# Patient Record
Sex: FEMALE | Race: WHITE | Employment: FULL TIME | URBAN - NONMETROPOLITAN AREA
[De-identification: names, ages, dates, MRNs, and addresses within clinical notes are randomized per-mention and may not be internally consistent; named-entity substitution may affect disease eponyms.]

---

## 2015-02-19 LAB — PAP SMEAR: NORMAL

## 2019-12-09 RX ORDER — ROSUVASTATIN CALCIUM 10 MG/1
TABLET, COATED ORAL
Qty: 90 TABLET | Refills: 0 | Status: SHIPPED | OUTPATIENT
Start: 2019-12-09 | End: 2020-01-13 | Stop reason: SDUPTHER

## 2019-12-17 RX ORDER — FLUOXETINE HYDROCHLORIDE 20 MG/1
CAPSULE ORAL
Qty: 90 CAPSULE | OUTPATIENT
Start: 2019-12-17

## 2019-12-18 RX ORDER — FLUOXETINE HYDROCHLORIDE 20 MG/1
20 CAPSULE ORAL DAILY
Qty: 30 CAPSULE | Refills: 0 | Status: SHIPPED | OUTPATIENT
Start: 2019-12-18 | End: 2020-01-13 | Stop reason: SDUPTHER

## 2020-01-13 ENCOUNTER — OFFICE VISIT (OUTPATIENT)
Dept: FAMILY MEDICINE CLINIC | Age: 60
End: 2020-01-13
Payer: COMMERCIAL

## 2020-01-13 ENCOUNTER — HOSPITAL ENCOUNTER (OUTPATIENT)
Age: 60
Discharge: HOME | End: 2020-01-15
Payer: COMMERCIAL

## 2020-01-13 VITALS
SYSTOLIC BLOOD PRESSURE: 138 MMHG | HEART RATE: 89 BPM | HEIGHT: 64 IN | DIASTOLIC BLOOD PRESSURE: 88 MMHG | TEMPERATURE: 97.5 F | WEIGHT: 170 LBS | OXYGEN SATURATION: 97 % | BODY MASS INDEX: 29.02 KG/M2

## 2020-01-13 PROBLEM — F32.9 REACTIVE DEPRESSION: Status: ACTIVE | Noted: 2020-01-13

## 2020-01-13 PROBLEM — E78.49 OTHER HYPERLIPIDEMIA: Status: ACTIVE | Noted: 2020-01-13

## 2020-01-13 LAB
ALBUMIN SERPL-MCNC: 4.7 G/DL (ref 3.5–5.2)
ALP BLD-CCNC: 85 U/L (ref 35–104)
ALT SERPL-CCNC: 17 U/L (ref 0–32)
AMYLASE: 21 U/L (ref 20–100)
ANION GAP SERPL CALCULATED.3IONS-SCNC: 15 MMOL/L (ref 7–16)
AST SERPL-CCNC: 19 U/L (ref 0–31)
BASOPHILS ABSOLUTE: 0.05 E9/L (ref 0–0.2)
BASOPHILS RELATIVE PERCENT: 0.6 % (ref 0–2)
BILIRUB SERPL-MCNC: 0.3 MG/DL (ref 0–1.2)
BILIRUBIN, POC: NEGATIVE
BLOOD URINE, POC: NEGATIVE
BUN BLDV-MCNC: 8 MG/DL (ref 6–20)
CALCIUM SERPL-MCNC: 10 MG/DL (ref 8.6–10.2)
CHLORIDE BLD-SCNC: 101 MMOL/L (ref 98–107)
CHOLESTEROL, TOTAL: 200 MG/DL (ref 0–199)
CLARITY, POC: CLEAR
CO2: 26 MMOL/L (ref 22–29)
COLOR, POC: NORMAL
CREAT SERPL-MCNC: 0.5 MG/DL (ref 0.5–1)
EOSINOPHILS ABSOLUTE: 0.06 E9/L (ref 0.05–0.5)
EOSINOPHILS RELATIVE PERCENT: 0.8 % (ref 0–6)
GAMMA GLUTAMYL TRANSFERASE: 23 U/L (ref 6–42)
GFR AFRICAN AMERICAN: >60
GFR NON-AFRICAN AMERICAN: >60 ML/MIN/1.73
GLUCOSE BLD-MCNC: 93 MG/DL (ref 74–99)
GLUCOSE URINE, POC: NEGATIVE
HCT VFR BLD CALC: 42.1 % (ref 34–48)
HDLC SERPL-MCNC: 52 MG/DL
HEMOGLOBIN: 12.8 G/DL (ref 11.5–15.5)
IMMATURE GRANULOCYTES #: 0.03 E9/L
IMMATURE GRANULOCYTES %: 0.4 % (ref 0–5)
KETONES, POC: NEGATIVE
LDL CHOLESTEROL CALCULATED: 116 MG/DL (ref 0–99)
LEUKOCYTE EST, POC: NEGATIVE
LYMPHOCYTES ABSOLUTE: 1.86 E9/L (ref 1.5–4)
LYMPHOCYTES RELATIVE PERCENT: 24 % (ref 20–42)
MCH RBC QN AUTO: 29.6 PG (ref 26–35)
MCHC RBC AUTO-ENTMCNC: 30.4 % (ref 32–34.5)
MCV RBC AUTO: 97.2 FL (ref 80–99.9)
MONOCYTES ABSOLUTE: 0.54 E9/L (ref 0.1–0.95)
MONOCYTES RELATIVE PERCENT: 7 % (ref 2–12)
NEUTROPHILS ABSOLUTE: 5.2 E9/L (ref 1.8–7.3)
NEUTROPHILS RELATIVE PERCENT: 67.2 % (ref 43–80)
NITRITE, POC: NEGATIVE
PDW BLD-RTO: 12.9 FL (ref 11.5–15)
PH, POC: 7.5
PLATELET # BLD: 332 E9/L (ref 130–450)
PMV BLD AUTO: 9.7 FL (ref 7–12)
POTASSIUM SERPL-SCNC: 4 MMOL/L (ref 3.5–5)
PROTEIN, POC: NEGATIVE
RBC # BLD: 4.33 E12/L (ref 3.5–5.5)
SODIUM BLD-SCNC: 142 MMOL/L (ref 132–146)
SPECIFIC GRAVITY, POC: 1.02
TOTAL PROTEIN: 7.5 G/DL (ref 6.4–8.3)
TRIGL SERPL-MCNC: 161 MG/DL (ref 0–149)
TSH SERPL DL<=0.05 MIU/L-ACNC: 1.32 UIU/ML (ref 0.27–4.2)
UROBILINOGEN, POC: NORMAL
VLDLC SERPL CALC-MCNC: 32 MG/DL
WBC # BLD: 7.7 E9/L (ref 4.5–11.5)

## 2020-01-13 PROCEDURE — 80053 COMPREHEN METABOLIC PANEL: CPT

## 2020-01-13 PROCEDURE — 85025 COMPLETE CBC W/AUTO DIFF WBC: CPT

## 2020-01-13 PROCEDURE — 82150 ASSAY OF AMYLASE: CPT

## 2020-01-13 PROCEDURE — 80061 LIPID PANEL: CPT

## 2020-01-13 PROCEDURE — 99396 PREV VISIT EST AGE 40-64: CPT | Performed by: FAMILY MEDICINE

## 2020-01-13 PROCEDURE — 84443 ASSAY THYROID STIM HORMONE: CPT

## 2020-01-13 PROCEDURE — 82977 ASSAY OF GGT: CPT

## 2020-01-13 PROCEDURE — 90471 IMMUNIZATION ADMIN: CPT | Performed by: FAMILY MEDICINE

## 2020-01-13 PROCEDURE — 36415 COLL VENOUS BLD VENIPUNCTURE: CPT

## 2020-01-13 PROCEDURE — 90715 TDAP VACCINE 7 YRS/> IM: CPT | Performed by: FAMILY MEDICINE

## 2020-01-13 PROCEDURE — 81002 URINALYSIS NONAUTO W/O SCOPE: CPT | Performed by: FAMILY MEDICINE

## 2020-01-13 RX ORDER — FLUOXETINE HYDROCHLORIDE 20 MG/1
20 CAPSULE ORAL DAILY
Qty: 90 CAPSULE | Refills: 1 | Status: SHIPPED
Start: 2020-01-13 | End: 2020-02-07 | Stop reason: SDUPTHER

## 2020-01-13 RX ORDER — ROSUVASTATIN CALCIUM 10 MG/1
TABLET, COATED ORAL
Qty: 90 TABLET | Refills: 1 | Status: SHIPPED
Start: 2020-01-13 | End: 2020-07-15 | Stop reason: SDUPTHER

## 2020-01-13 SDOH — HEALTH STABILITY: MENTAL HEALTH: HOW OFTEN DO YOU HAVE A DRINK CONTAINING ALCOHOL?: MONTHLY OR LESS

## 2020-01-13 SDOH — HEALTH STABILITY: MENTAL HEALTH: HOW MANY STANDARD DRINKS CONTAINING ALCOHOL DO YOU HAVE ON A TYPICAL DAY?: 1 OR 2

## 2020-01-13 ASSESSMENT — ENCOUNTER SYMPTOMS
DIARRHEA: 0
CONSTIPATION: 0
VOMITING: 0
BLOOD IN STOOL: 0
ABDOMINAL PAIN: 0
EYES NEGATIVE: 1
COUGH: 0
CHEST TIGHTNESS: 0
WHEEZING: 0

## 2020-01-13 ASSESSMENT — PATIENT HEALTH QUESTIONNAIRE - PHQ9
SUM OF ALL RESPONSES TO PHQ9 QUESTIONS 1 & 2: 0
2. FEELING DOWN, DEPRESSED OR HOPELESS: 0
SUM OF ALL RESPONSES TO PHQ QUESTIONS 1-9: 0
SUM OF ALL RESPONSES TO PHQ QUESTIONS 1-9: 0
1. LITTLE INTEREST OR PLEASURE IN DOING THINGS: 0

## 2020-01-13 NOTE — PROGRESS NOTES
OFFICE NOTE    1/13/20  Name: Tre Ca  U:[id number]   Sex:female   Age:59 y.o. SUBJECTIVE  Chief Complaint   Patient presents with    Depression    Hyperlipidemia       Pt presents for routine follow up. Requires routine medication refills. Was last seen 11/29/2018. Denies new s/s or complaints. She and  are eating healthier, exercising more and she is down 7 lbs from her weight 18 mos ago           Review of Systems   Constitutional: Negative for appetite change, fever and unexpected weight change. HENT: Negative for congestion, ear pain and postnasal drip. Eyes: Negative. Respiratory: Negative for cough, chest tightness and wheezing. Cardiovascular: Negative for chest pain and palpitations. Gastrointestinal: Negative for abdominal pain, blood in stool, constipation, diarrhea and vomiting. Endocrine: Negative for cold intolerance, polydipsia and polyuria. Genitourinary: Negative for dysuria and hematuria. Musculoskeletal: Negative for arthralgias, gait problem and joint swelling. Skin: Negative for rash and wound. Allergic/Immunologic: Negative for environmental allergies and food allergies. Neurological: Negative for dizziness, tremors, weakness and headaches. Hematological: Negative for adenopathy. Does not bruise/bleed easily. Psychiatric/Behavioral: Negative for behavioral problems, confusion, dysphoric mood and sleep disturbance. Current Outpatient Medications:     rosuvastatin (CRESTOR) 10 MG tablet, TAKE ONE TABLET BY MOUTH DAILY, Disp: 90 tablet, Rfl: 1    FLUoxetine (PROZAC) 20 MG capsule, Take 1 capsule by mouth daily, Disp: 90 capsule, Rfl: 1  Allergies   Allergen Reactions    Fish-Derived Products        No past medical history on file. No past surgical history on file. No family history on file.   Social History     Tobacco History     Smoking Status  Never Smoker    Smokeless Tobacco Use  Never Used          Alcohol History     Alcohol Use Status  Yes          Drug Use     Drug Use Status  Never          Sexual Activity     Sexually Active  Not Asked              OBJECTIVE  Vitals:    01/13/20 1014   BP: 138/88   Site: Right Upper Arm   Position: Sitting   Pulse: 89   Temp: 97.5 °F (36.4 °C)   TempSrc: Temporal   SpO2: 97%   Weight: 170 lb (77.1 kg)   Height: 5' 4\" (1.626 m)       Body mass index is 29.18 kg/m². Orders Placed This Encounter   Procedures    Tdap (age 6y and older) IM ([address])    CBC Auto Differential     Standing Status:   Future     Number of Occurrences:   1     Standing Expiration Date:   1/13/2021    Comprehensive Metabolic Panel     Standing Status:   Future     Number of Occurrences:   1     Standing Expiration Date:   1/13/2021    Lipid Panel     Standing Status:   Future     Number of Occurrences:   1     Standing Expiration Date:   1/13/2021     Order Specific Question:   Is Patient Fasting?/# of Hours     Answer:   fasting    TSH without Reflex     Standing Status:   Future     Number of Occurrences:   1     Standing Expiration Date:   1/13/2021    GAMMA GT     Standing Status:   Future     Number of Occurrences:   1     Standing Expiration Date:   1/13/2021    AMYLASE     Standing Status:   Future     Number of Occurrences:   1     Standing Expiration Date:   1/13/2021    POCT Urinalysis no Micro    POCT Fit Test     Standing Status:   Future     Standing Expiration Date:   1/13/2021        EXAM   Physical Exam  Vitals signs and nursing note reviewed. Constitutional:       Appearance: Normal appearance. She is well-developed. She is not toxic-appearing. Comments: overweight   HENT:      Right Ear: Tympanic membrane, ear canal and external ear normal.      Left Ear: Tympanic membrane, ear canal and external ear normal.      Nose: Nose normal. No congestion. Mouth/Throat:      Pharynx: Oropharynx is clear. Eyes:      General: No scleral icterus.      Conjunctiva/sclera: Conjunctivae normal. Pupils: Pupils are equal, round, and reactive to light. Neck:      Musculoskeletal: Normal range of motion and neck supple. Thyroid: No thyroid mass or thyromegaly. Vascular: No carotid bruit or JVD. Trachea: Trachea normal.   Cardiovascular:      Rate and Rhythm: Normal rate and regular rhythm. Heart sounds: Normal heart sounds. No murmur. No gallop. Pulmonary:      Effort: Pulmonary effort is normal.      Breath sounds: Normal breath sounds. No wheezing or rales. Abdominal:      General: Bowel sounds are normal. There is no distension. Palpations: Abdomen is soft. There is no mass. Tenderness: There is no tenderness. There is no guarding. Musculoskeletal: Normal range of motion. General: No swelling or tenderness. Right lower leg: No edema. Left lower leg: No edema. Lymphadenopathy:      Cervical: No cervical adenopathy. Skin:     General: Skin is warm and dry. Capillary Refill: Capillary refill takes less than 2 seconds. Coloration: Skin is not jaundiced. Findings: No bruising or rash. Neurological:      Mental Status: She is alert and oriented to person, place, and time. Motor: No abnormal muscle tone. Psychiatric:         Mood and Affect: Mood normal.         Behavior: Behavior normal.           ASSESSMENT/PLAN:  Maryam was seen today for depression and hyperlipidemia. Diagnoses and all orders for this visit:    Encounter for wellness examination in adult  -     CBC Auto Differential; Future  -     Comprehensive Metabolic Panel; Future  -     Lipid Panel; Future  -     TSH without Reflex; Future  -     POCT Urinalysis no Micro  -     POCT Fit Test; Future  -     Tdap (age 6y and older) IM ([facility]X)  Following better diet, exercising. Sees Gyn yearly    Reactive depression  -     FLUoxetine (PROZAC) 20 MG capsule;  Take 1 capsule by mouth daily  -     CBC Auto Differential; Future  -     Comprehensive Metabolic Panel; Future  Stable not ready to take her off this yet    Other hyperlipidemia  -     rosuvastatin (CRESTOR) 10 MG tablet; TAKE ONE TABLET BY MOUTH DAILY  -     Lipid Panel; Future  -     TSH without Reflex; Future    SHOEMAKER (nonalcoholic steatohepatitis)  -     GAMMA GT; Future  -     AMYLASE; Future  Family h/o pancreatic cancer in 2 first degree relatives, h/o SHOEMAKER by her description. Diet and weight loss should help    Immunization due  -     Tdap (age 6y and older) IM ([facility] Extension)    Screen for colon cancer  -     POCT Fit Test; Future         Return in about 6 months (around 7/13/2020). Electronically signed by Nate Forrester MD on 1/13/20 at 10:45 AM    I have personally reviewed and updated the chief complaint, HPI, Past Medical, Family and Social History, as well as the above Review of Systems.

## 2020-02-07 RX ORDER — FLUOXETINE HYDROCHLORIDE 20 MG/1
20 CAPSULE ORAL DAILY
Qty: 90 CAPSULE | Refills: 1 | Status: SHIPPED
Start: 2020-02-07 | End: 2020-07-15 | Stop reason: SDUPTHER

## 2020-02-26 ENCOUNTER — TELEPHONE (OUTPATIENT)
Dept: FAMILY MEDICINE CLINIC | Age: 60
End: 2020-02-26

## 2020-07-15 ENCOUNTER — OFFICE VISIT (OUTPATIENT)
Dept: FAMILY MEDICINE CLINIC | Age: 60
End: 2020-07-15
Payer: COMMERCIAL

## 2020-07-15 VITALS
RESPIRATION RATE: 18 BRPM | TEMPERATURE: 97.6 F | DIASTOLIC BLOOD PRESSURE: 78 MMHG | BODY MASS INDEX: 29.37 KG/M2 | WEIGHT: 172 LBS | SYSTOLIC BLOOD PRESSURE: 126 MMHG | HEART RATE: 91 BPM | HEIGHT: 64 IN | OXYGEN SATURATION: 97 %

## 2020-07-15 PROBLEM — Z80.0 FAMILY HISTORY OF MALIGNANT NEOPLASM OF PANCREAS: Status: ACTIVE | Noted: 2020-07-15

## 2020-07-15 PROCEDURE — 93000 ELECTROCARDIOGRAM COMPLETE: CPT | Performed by: FAMILY MEDICINE

## 2020-07-15 PROCEDURE — 99214 OFFICE O/P EST MOD 30 MIN: CPT | Performed by: FAMILY MEDICINE

## 2020-07-15 RX ORDER — FLUOXETINE HYDROCHLORIDE 20 MG/1
20 CAPSULE ORAL DAILY
Qty: 90 CAPSULE | Refills: 1 | Status: SHIPPED | OUTPATIENT
Start: 2020-07-15

## 2020-07-15 RX ORDER — ROSUVASTATIN CALCIUM 10 MG/1
TABLET, COATED ORAL
Qty: 90 TABLET | Refills: 1 | Status: SHIPPED | OUTPATIENT
Start: 2020-07-15

## 2020-07-15 ASSESSMENT — ENCOUNTER SYMPTOMS
EYES NEGATIVE: 1
EYE REDNESS: 0
CHEST TIGHTNESS: 0
CONSTIPATION: 0
COUGH: 0
DIARRHEA: 0
PHOTOPHOBIA: 0
BLOOD IN STOOL: 0
VOMITING: 0
ABDOMINAL PAIN: 0
WHEEZING: 0

## 2020-07-15 NOTE — PROGRESS NOTES
OFFICE NOTE    7/15/20  Name: Lynn Robison  JDS:[id number]   Sex:female   Age:59 y.o. SUBJECTIVE  Chief Complaint   Patient presents with    6 Month Follow-Up    Hyperlipidemia    Medication Refill       HPI Comes in for checkup and refllls. Goes to [facility] for Gyn    Review of Systems   Constitutional: Negative for appetite change, fever and unexpected weight change. HENT: Negative for congestion, ear pain and postnasal drip. Eyes: Negative. Negative for photophobia, redness and visual disturbance. Respiratory: Negative for cough, chest tightness and wheezing. Cardiovascular: Negative for chest pain and palpitations. Gastrointestinal: Negative for abdominal pain, blood in stool, constipation, diarrhea and vomiting. Endocrine: Negative for cold intolerance, polydipsia and polyuria. Genitourinary: Negative for dysuria and hematuria. Musculoskeletal: Negative for arthralgias, gait problem and joint swelling. Skin: Negative for rash and wound. Allergic/Immunologic: Negative for environmental allergies and food allergies. Neurological: Negative for dizziness, tremors, seizures, weakness, numbness and headaches. Hematological: Negative for adenopathy. Does not bruise/bleed easily. Psychiatric/Behavioral: Negative for behavioral problems, confusion, dysphoric mood and sleep disturbance. Current Outpatient Medications:     FLUoxetine (PROZAC) 20 MG capsule, Take 1 capsule by mouth daily, Disp: 90 capsule, Rfl: 1    rosuvastatin (CRESTOR) 10 MG tablet, TAKE ONE TABLET BY MOUTH DAILY, Disp: 90 tablet, Rfl: 1  Allergies   Allergen Reactions    Fish-Derived Products        No past medical history on file. No past surgical history on file. No family history on file.   Social History     Tobacco History     Smoking Status  Never Smoker    Smokeless Tobacco Use  Never Used          Alcohol History     Alcohol Use Status  Yes          Drug Use     Drug Use Status  Never Sexual Activity     Sexually Active  Not Asked                OBJECTIVE  Vitals:    07/15/20 0835   BP: 126/78   Pulse: 91   Resp: 18   Temp: 97.6 °F (36.4 °C)   TempSrc: Temporal   SpO2: 97%   Weight: 172 lb (78 kg)   Height: 5' 4\" (1.626 m)        Body mass index is 29.52 kg/m². Orders Placed This Encounter   Procedures    EKG 12 lead     Order Specific Question:   Reason for Exam?     Answer:   Hypertension        EXAM   Physical Exam  Vitals signs and nursing note reviewed. Constitutional:       Appearance: Normal appearance. She is well-developed. Comments: overweight   HENT:      Right Ear: Tympanic membrane, ear canal and external ear normal.      Left Ear: Tympanic membrane, ear canal and external ear normal.      Nose: Nose normal. No congestion. Mouth/Throat:      Pharynx: Oropharynx is clear. Eyes:      General: No scleral icterus. Conjunctiva/sclera: Conjunctivae normal.      Pupils: Pupils are equal, round, and reactive to light. Neck:      Musculoskeletal: Normal range of motion and neck supple. Thyroid: No thyroid mass or thyromegaly. Vascular: No carotid bruit or JVD. Trachea: Trachea normal.   Cardiovascular:      Rate and Rhythm: Normal rate and regular rhythm. Heart sounds: Normal heart sounds. No murmur. No gallop. Pulmonary:      Effort: Pulmonary effort is normal.      Breath sounds: Normal breath sounds. No wheezing, rhonchi or rales. Abdominal:      General: Bowel sounds are normal. There is no distension. Palpations: Abdomen is soft. There is no mass. Tenderness: There is no abdominal tenderness. There is no guarding. Musculoskeletal: Normal range of motion. General: No swelling or tenderness. Right lower leg: No edema. Left lower leg: No edema. Lymphadenopathy:      Cervical: No cervical adenopathy. Skin:     General: Skin is warm and dry.       Capillary Refill: Capillary refill takes less than 2 seconds. Coloration: Skin is not jaundiced. Findings: No bruising or rash. Neurological:      General: No focal deficit present. Mental Status: She is alert and oriented to person, place, and time. Motor: No abnormal muscle tone. Psychiatric:         Mood and Affect: Mood normal.         Behavior: Behavior normal.           Maryam was seen today for 6 month follow-up, hyperlipidemia and medication refill. Diagnoses and all orders for this visit:    Hypertension, unspecified type  -     EKG 12 lead  Well controlled, labs current, no changes made    Reactive depression  -     FLUoxetine (PROZAC) 20 MG capsule; Take 1 capsule by mouth daily  Mother bipolar, feels it helps her, seems well grounded    Other hyperlipidemia  -     rosuvastatin (CRESTOR) 10 MG tablet; TAKE ONE TABLET BY MOUTH DAILY      Family history of pancreatic cancer. Seen in [facility] Mother and her sister have    No follow-ups on file.     Electronically signed by Casey Alexandra MD on 7/15/20 at 8:36 AM EDT

## 2021-05-15 ENCOUNTER — OFFICE VISIT (OUTPATIENT)
Dept: FAMILY MEDICINE CLINIC | Age: 61
End: 2021-05-15

## 2021-05-15 VITALS
HEIGHT: 64 IN | TEMPERATURE: 96.6 F | OXYGEN SATURATION: 97 % | SYSTOLIC BLOOD PRESSURE: 116 MMHG | HEART RATE: 109 BPM | BODY MASS INDEX: 31.14 KG/M2 | WEIGHT: 182.4 LBS | RESPIRATION RATE: 18 BRPM | DIASTOLIC BLOOD PRESSURE: 78 MMHG

## 2021-05-15 DIAGNOSIS — M54.50 LOW BACK PAIN, UNSPECIFIED BACK PAIN LATERALITY, UNSPECIFIED CHRONICITY, UNSPECIFIED WHETHER SCIATICA PRESENT: Primary | ICD-10-CM

## 2021-05-15 LAB
BILIRUBIN, POC: NEGATIVE
BLOOD URINE, POC: NEGATIVE
CLARITY, POC: CLEAR
COLOR, POC: YELLOW
GLUCOSE URINE, POC: NEGATIVE
KETONES, POC: NEGATIVE
LEUKOCYTE EST, POC: NEGATIVE
NITRITE, POC: NEGATIVE
PH, POC: 5.5
PROTEIN, POC: NEGATIVE
SPECIFIC GRAVITY, POC: 1.01
UROBILINOGEN, POC: 0.2

## 2021-05-15 PROCEDURE — 81002 URINALYSIS NONAUTO W/O SCOPE: CPT | Performed by: FAMILY MEDICINE

## 2021-05-15 PROCEDURE — 99213 OFFICE O/P EST LOW 20 MIN: CPT | Performed by: FAMILY MEDICINE

## 2021-05-15 RX ORDER — SULFAMETHOXAZOLE AND TRIMETHOPRIM 800; 160 MG/1; MG/1
1 TABLET ORAL 2 TIMES DAILY
Qty: 14 TABLET | Refills: 0 | Status: SHIPPED | OUTPATIENT
Start: 2021-05-15 | End: 2021-05-22

## 2021-05-18 LAB
ORGANISM: ABNORMAL
URINE CULTURE, ROUTINE: ABNORMAL

## 2022-06-22 ENCOUNTER — OFFICE VISIT (OUTPATIENT)
Dept: FAMILY MEDICINE CLINIC | Age: 62
End: 2022-06-22
Payer: COMMERCIAL

## 2022-06-22 VITALS
SYSTOLIC BLOOD PRESSURE: 112 MMHG | OXYGEN SATURATION: 97 % | BODY MASS INDEX: 29.16 KG/M2 | HEART RATE: 82 BPM | DIASTOLIC BLOOD PRESSURE: 80 MMHG | TEMPERATURE: 97.1 F | WEIGHT: 170.8 LBS | HEIGHT: 64 IN

## 2022-06-22 DIAGNOSIS — L23.7 POISON IVY DERMATITIS: Primary | ICD-10-CM

## 2022-06-22 PROCEDURE — 99212 OFFICE O/P EST SF 10 MIN: CPT | Performed by: FAMILY MEDICINE

## 2022-06-22 RX ORDER — PREDNISONE 10 MG/1
TABLET ORAL
Qty: 30 TABLET | Refills: 0 | Status: SHIPPED
Start: 2022-06-22 | End: 2022-08-12 | Stop reason: ALTCHOICE

## 2022-06-22 ASSESSMENT — ENCOUNTER SYMPTOMS: COLOR CHANGE: 1

## 2022-06-22 NOTE — PROGRESS NOTES
6/22/2022), Disp: 90 tablet, Rfl: 1   Patient Active Problem List   Diagnosis    Reactive depression    Other hyperlipidemia    Family history of malignant neoplasm of pancreas    Poison ivy dermatitis     No past medical history on file. No past surgical history on file. Social History     Socioeconomic History    Marital status:      Spouse name: Not on file    Number of children: Not on file    Years of education: Not on file    Highest education level: Not on file   Occupational History    Not on file   Tobacco Use    Smoking status: Never Smoker    Smokeless tobacco: Never Used   Vaping Use    Vaping Use: Never used   Substance and Sexual Activity    Alcohol use: Yes    Drug use: Never    Sexual activity: Not on file   Other Topics Concern    Not on file   Social History Narrative    Not on file     Social Determinants of Health     Financial Resource Strain:     Difficulty of Paying Living Expenses: Not on file   Food Insecurity:     Worried About Running Out of Food in the Last Year: Not on file    Ernestina of Food in the Last Year: Not on file   Transportation Needs:     Lack of Transportation (Medical): Not on file    Lack of Transportation (Non-Medical):  Not on file   Physical Activity:     Days of Exercise per Week: Not on file    Minutes of Exercise per Session: Not on file   Stress:     Feeling of Stress : Not on file   Social Connections:     Frequency of Communication with Friends and Family: Not on file    Frequency of Social Gatherings with Friends and Family: Not on file    Attends Taoism Services: Not on file    Active Member of Clubs or Organizations: Not on file    Attends Club or Organization Meetings: Not on file    Marital Status: Not on file   Intimate Partner Violence:     Fear of Current or Ex-Partner: Not on file    Emotionally Abused: Not on file    Physically Abused: Not on file    Sexually Abused: Not on file   Housing Stability:     Unable to Pay for Housing in the Last Year: Not on file    Number of Places Lived in the Last Year: Not on file    Unstable Housing in the Last Year: Not on file     No family history on file. There are no preventive care reminders to display for this patient. There are no preventive care reminders to display for this patient. Diabetes Management   Topic Date Due    Lipids  01/13/2021      Health Maintenance Due   Topic    Shingles vaccine (1 of 2)      Health Maintenance   Topic Date Due    Depression Monitoring  Never done    HIV screen  Never done    Hepatitis C screen  Never done    Colorectal Cancer Screen  Never done    Shingles vaccine (1 of 2) Never done    Cervical cancer screen  02/17/2018    Lipids  01/13/2021    Breast cancer screen  08/26/2021    COVID-19 Vaccine (3 - Booster for Pfizer series) 02/02/2022    Flu vaccine (Season Ended) 09/01/2022    DTaP/Tdap/Td vaccine (3 - Td or Tdap) 01/13/2030    Hepatitis A vaccine  Aged Out    Hepatitis B vaccine  Aged Out    Hib vaccine  Aged Out    Meningococcal (ACWY) vaccine  Aged Out    Pneumococcal 0-64 years Vaccine  Aged Out      There are no preventive care reminders to display for this patient. There are no preventive care reminders to display for this patient. /80   Pulse 82   Temp 97.1 °F (36.2 °C) (Temporal)   Ht 5' 4\" (1.626 m)   Wt 170 lb 12.8 oz (77.5 kg)   SpO2 97%   BMI 29.32 kg/m²     Objective   Physical Exam  Vitals reviewed. Constitutional:       Appearance: Normal appearance. HENT:      Head: Normocephalic and atraumatic. Cardiovascular:      Rate and Rhythm: Normal rate. Pulmonary:      Effort: Pulmonary effort is normal.   Skin:     Findings: Erythema and rash present. Comments: Patient has scattered erythematous macular lesions of the right wrist and flank. No vesicular pustular lesions. No cellulitic change. Neurological:      General: No focal deficit present.       Mental

## 2022-08-12 ENCOUNTER — OFFICE VISIT (OUTPATIENT)
Dept: FAMILY MEDICINE CLINIC | Age: 62
End: 2022-08-12
Payer: COMMERCIAL

## 2022-08-12 VITALS
SYSTOLIC BLOOD PRESSURE: 120 MMHG | BODY MASS INDEX: 29.57 KG/M2 | WEIGHT: 173.2 LBS | RESPIRATION RATE: 16 BRPM | HEIGHT: 64 IN | TEMPERATURE: 97.6 F | HEART RATE: 104 BPM | DIASTOLIC BLOOD PRESSURE: 78 MMHG | OXYGEN SATURATION: 97 %

## 2022-08-12 DIAGNOSIS — J01.40 ACUTE NON-RECURRENT PANSINUSITIS: Primary | ICD-10-CM

## 2022-08-12 DIAGNOSIS — L24.7 IRRITANT CONTACT DERMATITIS DUE TO PLANTS, EXCEPT FOOD: ICD-10-CM

## 2022-08-12 PROCEDURE — 99213 OFFICE O/P EST LOW 20 MIN: CPT | Performed by: FAMILY MEDICINE

## 2022-08-12 RX ORDER — PREDNISONE 10 MG/1
TABLET ORAL
Qty: 18 TABLET | Refills: 0 | Status: SHIPPED | OUTPATIENT
Start: 2022-08-12 | End: 2022-08-21

## 2022-08-12 RX ORDER — AMOXICILLIN 500 MG/1
500 CAPSULE ORAL 3 TIMES DAILY
Qty: 30 CAPSULE | Refills: 0 | Status: SHIPPED | OUTPATIENT
Start: 2022-08-12 | End: 2022-08-22

## 2022-08-12 ASSESSMENT — ENCOUNTER SYMPTOMS
DIARRHEA: 0
TROUBLE SWALLOWING: 0
ALLERGIC/IMMUNOLOGIC NEGATIVE: 1
SINUS PAIN: 0
CHEST TIGHTNESS: 0
BACK PAIN: 0
EYE DISCHARGE: 0
COUGH: 0
PHOTOPHOBIA: 0
ABDOMINAL PAIN: 0
SORE THROAT: 0
VOMITING: 0
SHORTNESS OF BREATH: 0
BLOOD IN STOOL: 0
SINUS PRESSURE: 1
NAUSEA: 0
EYE REDNESS: 0
ROS SKIN COMMENTS: UPPER EXTREMITIES
EYE PAIN: 0

## 2022-08-12 NOTE — PROGRESS NOTES
22  Maryam Mojica : 1960 Sex: female  Age: 63 y.o. Assessment and Plan:  Maryam was seen today for drainage, poison ivy and facial pain. Diagnoses and all orders for this visit:    Acute non-recurrent pansinusitis  -     amoxicillin (AMOXIL) 500 MG capsule; Take 1 capsule by mouth in the morning and 1 capsule at noon and 1 capsule before bedtime. Do all this for 10 days. -     predniSONE (DELTASONE) 10 MG tablet; Take 3 tablets by mouth daily for 3 days, THEN 2 tablets daily for 3 days, THEN 1 tablet daily for 3 days. Irritant contact dermatitis due to plants, except food  -     predniSONE (DELTASONE) 10 MG tablet; Take 3 tablets by mouth daily for 3 days, THEN 2 tablets daily for 3 days, THEN 1 tablet daily for 3 days. Will treat her sinusitis with 10 days of amoxicillin and a prednisone taper. We will also be effective against her contact rash. Should her complaints not improve, or worsen in any way, she should present back for recheck. Return 3 to 5-day recheck if not improved. .    Chief Complaint   Patient presents with    Drainage     Sinus drainage, Onset x 3 days ago. Pt states yellow drainage from her nose. Poison Claudia     Onset x6 days, Mostly on R arm    Facial Pain     Pt states sinus pain and pressure       Congestion, pressure, drainage, facial tenderness, mild headache, onset 3 days ago. Denies fever, chills, diaphoresis, nausea, vomiting, decreased oral intake. Denies other GI or  complaints. Patient states rash started 2 days ago. Patient was outside and exposed to poison ivy. The rash is pruritic in nature. No pain. The patient denies any other food, drug and or environmental allergens. Denies throat swelling or dyspnea. Denies fever or chills. Patient has been using OTC medications without improvement in symptoms. Review of Systems   Constitutional:  Negative for appetite change, fatigue and unexpected weight change.    HENT:  Positive for congestion, postnasal drip and sinus pressure. Negative for ear pain, hearing loss, sinus pain, sore throat and trouble swallowing. Eyes:  Negative for photophobia, pain, discharge and redness. Respiratory:  Negative for cough, chest tightness and shortness of breath. Cardiovascular:  Negative for chest pain, palpitations and leg swelling. Gastrointestinal:  Negative for abdominal pain, blood in stool, diarrhea, nausea and vomiting. Endocrine: Negative. Genitourinary:  Negative for dysuria, flank pain, frequency and hematuria. Musculoskeletal:  Negative for arthralgias, back pain, joint swelling and myalgias. Skin:  Positive for rash. Upper extremities   Allergic/Immunologic: Negative. Neurological:  Negative for dizziness, seizures, syncope, weakness, light-headedness, numbness and headaches. Hematological:  Negative for adenopathy. Does not bruise/bleed easily. Psychiatric/Behavioral: Negative. Current Outpatient Medications:     amoxicillin (AMOXIL) 500 MG capsule, Take 1 capsule by mouth in the morning and 1 capsule at noon and 1 capsule before bedtime. Do all this for 10 days. , Disp: 30 capsule, Rfl: 0    predniSONE (DELTASONE) 10 MG tablet, Take 3 tablets by mouth daily for 3 days, THEN 2 tablets daily for 3 days, THEN 1 tablet daily for 3 days. , Disp: 18 tablet, Rfl: 0    FLUoxetine (PROZAC) 20 MG capsule, Take 1 capsule by mouth daily (Patient not taking: No sig reported), Disp: 90 capsule, Rfl: 1    rosuvastatin (CRESTOR) 10 MG tablet, TAKE ONE TABLET BY MOUTH DAILY (Patient not taking: No sig reported), Disp: 90 tablet, Rfl: 1  Allergies   Allergen Reactions    Fish-Derived Products        No past medical history on file. No past surgical history on file. No family history on file.   Social History     Socioeconomic History    Marital status:      Spouse name: Not on file    Number of children: Not on file    Years of education: Not on file    Highest education level: Not on file   Occupational History    Not on file   Tobacco Use    Smoking status: Never    Smokeless tobacco: Never   Vaping Use    Vaping Use: Never used   Substance and Sexual Activity    Alcohol use: Yes    Drug use: Never    Sexual activity: Not on file   Other Topics Concern    Not on file   Social History Narrative    Not on file     Social Determinants of Health     Financial Resource Strain: Not on file   Food Insecurity: Not on file   Transportation Needs: Not on file   Physical Activity: Not on file   Stress: Not on file   Social Connections: Not on file   Intimate Partner Violence: Not on file   Housing Stability: Not on file       Vitals:    08/12/22 1333   BP: 120/78   Pulse: (!) 104   Resp: 16   Temp: 97.6 °F (36.4 °C)   TempSrc: Temporal   SpO2: 97%   Weight: 173 lb 3.2 oz (78.6 kg)   Height: 5' 4\" (1.626 m)       Physical Exam  Vitals and nursing note reviewed. Constitutional:       Appearance: She is well-developed. HENT:      Head: Atraumatic. Right Ear: External ear normal.      Left Ear: External ear normal.      Nose: Congestion present. Mouth/Throat:      Pharynx: Posterior oropharyngeal erythema present. No oropharyngeal exudate. Eyes:      Conjunctiva/sclera: Conjunctivae normal.      Pupils: Pupils are equal, round, and reactive to light. Neck:      Thyroid: No thyromegaly. Trachea: No tracheal deviation. Cardiovascular:      Rate and Rhythm: Normal rate and regular rhythm. Heart sounds: No murmur heard. No friction rub. No gallop. Pulmonary:      Effort: Pulmonary effort is normal. No respiratory distress. Breath sounds: Normal breath sounds. Abdominal:      General: Bowel sounds are normal.      Palpations: Abdomen is soft. Musculoskeletal:         General: No tenderness or deformity. Normal range of motion. Cervical back: Normal range of motion and neck supple. Lymphadenopathy:      Cervical: No cervical adenopathy. Skin:     General: Skin is warm and dry. Capillary Refill: Capillary refill takes less than 2 seconds. Findings: Erythema and rash present. Comments: Erythematous, linear, vesicular, pruritic, rash, upper extremities   Neurological:      Mental Status: She is alert and oriented to person, place, and time. Sensory: No sensory deficit. Motor: No abnormal muscle tone.       Coordination: Coordination normal.      Deep Tendon Reflexes: Reflexes normal.           Seen By:  Blake Walters,

## 2022-09-26 ENCOUNTER — TELEPHONE (OUTPATIENT)
Dept: FAMILY MEDICINE CLINIC | Age: 62
End: 2022-09-26

## 2022-09-26 NOTE — TELEPHONE ENCOUNTER
----- Message from Sulemakaren Mazinamilcar sent at 9/26/2022 12:58 PM EDT -----  Subject: Message to Provider    QUESTIONS  Information for Provider? Patient requesting to schedule her annual   physical appointment for an earlier date if possible. screened green  ---------------------------------------------------------------------------  --------------  Sydney Scherer INFO  [phone]; OK to leave message on voicemail  ---------------------------------------------------------------------------  --------------  SCRIPT ANSWERS  Relationship to Patient?  Self

## 2022-09-26 NOTE — TELEPHONE ENCOUNTER
Spoke w/ Maryam and let her know that Dr Nohemy Ray does not currently have an opening that would allow us to move up her physical.     I told her that if am an appt becomes available we would give her a call.

## 2023-01-11 ENCOUNTER — TELEPHONE (OUTPATIENT)
Dept: FAMILY MEDICINE CLINIC | Age: 63
End: 2023-01-11

## 2023-01-11 RX ORDER — FLUOXETINE HYDROCHLORIDE 20 MG/1
20 CAPSULE ORAL DAILY
Qty: 30 CAPSULE | Refills: 1 | Status: SHIPPED
Start: 2023-01-11 | End: 2023-02-15 | Stop reason: SDUPTHER

## 2023-01-11 RX ORDER — ROSUVASTATIN CALCIUM 10 MG/1
10 TABLET, COATED ORAL DAILY
Qty: 30 TABLET | Refills: 1 | Status: SHIPPED
Start: 2023-01-11 | End: 2023-02-15 | Stop reason: SDUPTHER

## 2023-01-11 NOTE — TELEPHONE ENCOUNTER
Patient has not seen Dr Tawanna Kurtz since 5/15/21. When in [facility] on 8/12/21 she reported not taking medications. Called to clarify information with patient, she has not been on any medication for over a year. She states work has been really stressful and she really needs to get back on her medications. She is currently scheduled for 2/15, no availability prior to that appt. How would you like to proceed?

## 2023-01-11 NOTE — TELEPHONE ENCOUNTER
----- Message from Monique Willis sent at 1/11/2023  9:29 AM EST -----  Subject: Refill Request    QUESTIONS  Name of Medication? FLUoxetine (PROZAC) 20 MG capsule  Patient-reported dosage and instructions? 1 pill in morning  How many days do you have left? 0  Preferred Pharmacy? [address] phone number (if available)? [phone]  ---------------------------------------------------------------------------  --------------,  Name of Medication? rosuvastatin (CRESTOR) 10 MG tablet  Patient-reported dosage and instructions? 1 pill in the morning   How many days do you have left? 0  Preferred Pharmacy? [address] phone number (if available)? [phone]  ---------------------------------------------------------------------------  --------------  CALL BACK INFO  What is the best way for the office to contact you? OK to leave message on   voicemail  Preferred Call Back Phone Number? [phone]  ---------------------------------------------------------------------------  --------------  SCRIPT ANSWERS  Relationship to Patient?  Self

## 2023-02-15 ENCOUNTER — OFFICE VISIT (OUTPATIENT)
Dept: FAMILY MEDICINE CLINIC | Age: 63
End: 2023-02-15
Payer: COMMERCIAL

## 2023-02-15 VITALS
RESPIRATION RATE: 17 BRPM | TEMPERATURE: 97.8 F | DIASTOLIC BLOOD PRESSURE: 68 MMHG | BODY MASS INDEX: 28.68 KG/M2 | SYSTOLIC BLOOD PRESSURE: 112 MMHG | OXYGEN SATURATION: 93 % | WEIGHT: 168 LBS | HEART RATE: 100 BPM | HEIGHT: 64 IN

## 2023-02-15 DIAGNOSIS — Z00.01 ENCOUNTER FOR PREVENTATIVE ADULT HEALTH CARE EXAM WITH ABNORMAL FINDINGS: ICD-10-CM

## 2023-02-15 DIAGNOSIS — Z12.11 SCREEN FOR COLON CANCER: ICD-10-CM

## 2023-02-15 DIAGNOSIS — F43.10 PTSD (POST-TRAUMATIC STRESS DISORDER): ICD-10-CM

## 2023-02-15 DIAGNOSIS — I10 PRIMARY HYPERTENSION: Primary | ICD-10-CM

## 2023-02-15 DIAGNOSIS — E78.49 OTHER HYPERLIPIDEMIA: ICD-10-CM

## 2023-02-15 PROCEDURE — 3074F SYST BP LT 130 MM HG: CPT | Performed by: FAMILY MEDICINE

## 2023-02-15 PROCEDURE — 3078F DIAST BP <80 MM HG: CPT | Performed by: FAMILY MEDICINE

## 2023-02-15 PROCEDURE — 93000 ELECTROCARDIOGRAM COMPLETE: CPT | Performed by: FAMILY MEDICINE

## 2023-02-15 PROCEDURE — 99396 PREV VISIT EST AGE 40-64: CPT | Performed by: FAMILY MEDICINE

## 2023-02-15 RX ORDER — FLUOXETINE HYDROCHLORIDE 20 MG/1
20 CAPSULE ORAL DAILY
Qty: 90 CAPSULE | Refills: 1 | Status: SHIPPED | OUTPATIENT
Start: 2023-02-15

## 2023-02-15 RX ORDER — ROSUVASTATIN CALCIUM 10 MG/1
10 TABLET, COATED ORAL DAILY
Qty: 90 TABLET | Refills: 1 | Status: SHIPPED | OUTPATIENT
Start: 2023-02-15

## 2023-02-15 SDOH — ECONOMIC STABILITY: FOOD INSECURITY: WITHIN THE PAST 12 MONTHS, THE FOOD YOU BOUGHT JUST DIDN'T LAST AND YOU DIDN'T HAVE MONEY TO GET MORE.: NEVER TRUE

## 2023-02-15 SDOH — ECONOMIC STABILITY: FOOD INSECURITY: WITHIN THE PAST 12 MONTHS, YOU WORRIED THAT YOUR FOOD WOULD RUN OUT BEFORE YOU GOT MONEY TO BUY MORE.: NEVER TRUE

## 2023-02-15 SDOH — ECONOMIC STABILITY: INCOME INSECURITY: HOW HARD IS IT FOR YOU TO PAY FOR THE VERY BASICS LIKE FOOD, HOUSING, MEDICAL CARE, AND HEATING?: NOT HARD AT ALL

## 2023-02-15 SDOH — ECONOMIC STABILITY: HOUSING INSECURITY
IN THE LAST 12 MONTHS, WAS THERE A TIME WHEN YOU DID NOT HAVE A STEADY PLACE TO SLEEP OR SLEPT IN A SHELTER (INCLUDING NOW)?: NO

## 2023-02-15 ASSESSMENT — PATIENT HEALTH QUESTIONNAIRE - PHQ9
4. FEELING TIRED OR HAVING LITTLE ENERGY: 0
2. FEELING DOWN, DEPRESSED OR HOPELESS: 0
SUM OF ALL RESPONSES TO PHQ QUESTIONS 1-9: 2
3. TROUBLE FALLING OR STAYING ASLEEP: 1
SUM OF ALL RESPONSES TO PHQ QUESTIONS 1-9: 2
7. TROUBLE CONCENTRATING ON THINGS, SUCH AS READING THE NEWSPAPER OR WATCHING TELEVISION: 0
SUM OF ALL RESPONSES TO PHQ QUESTIONS 1-9: 2
8. MOVING OR SPEAKING SO SLOWLY THAT OTHER PEOPLE COULD HAVE NOTICED. OR THE OPPOSITE, BEING SO FIGETY OR RESTLESS THAT YOU HAVE BEEN MOVING AROUND A LOT MORE THAN USUAL: 0
SUM OF ALL RESPONSES TO PHQ QUESTIONS 1-9: 2
6. FEELING BAD ABOUT YOURSELF - OR THAT YOU ARE A FAILURE OR HAVE LET YOURSELF OR YOUR FAMILY DOWN: 1
5. POOR APPETITE OR OVEREATING: 0
SUM OF ALL RESPONSES TO PHQ9 QUESTIONS 1 & 2: 0
1. LITTLE INTEREST OR PLEASURE IN DOING THINGS: 0
9. THOUGHTS THAT YOU WOULD BE BETTER OFF DEAD, OR OF HURTING YOURSELF: 0
10. IF YOU CHECKED OFF ANY PROBLEMS, HOW DIFFICULT HAVE THESE PROBLEMS MADE IT FOR YOU TO DO YOUR WORK, TAKE CARE OF THINGS AT HOME, OR GET ALONG WITH OTHER PEOPLE: 0

## 2023-02-15 ASSESSMENT — LIFESTYLE VARIABLES
HOW OFTEN DO YOU HAVE A DRINK CONTAINING ALCOHOL: 2-4 TIMES A MONTH
HOW MANY STANDARD DRINKS CONTAINING ALCOHOL DO YOU HAVE ON A TYPICAL DAY: 1 OR 2

## 2023-02-15 ASSESSMENT — ENCOUNTER SYMPTOMS
DIARRHEA: 0
VOMITING: 0
WHEEZING: 0
CONSTIPATION: 0
EYE REDNESS: 0
CHEST TIGHTNESS: 0
COUGH: 0
PHOTOPHOBIA: 0
ABDOMINAL PAIN: 0
EYES NEGATIVE: 1
BLOOD IN STOOL: 0

## 2023-02-15 NOTE — PROGRESS NOTES
OFFICE NOTE    2/15/23  Name: Waymon Schwab  [facility]:[id number]   Sex:female   Age:62 y.o. SUBJECTIVE  Chief Complaint   Patient presents with    Medication Refill       HPI comes in for checkup and refills. Father, sister and several other family members and aquaintnances have taken their own lives. Now works [employer]    Review of Systems   Constitutional:  Positive for fatigue. Negative for appetite change, fever and unexpected weight change. HENT:  Negative for congestion, ear pain and postnasal drip. Eyes: Negative. Negative for photophobia, redness and visual disturbance. Respiratory:  Negative for cough, chest tightness and wheezing. Cardiovascular:  Negative for chest pain and palpitations. Gastrointestinal:  Negative for abdominal pain, blood in stool, constipation, diarrhea and vomiting. Endocrine: Negative for cold intolerance, polydipsia and polyuria. Genitourinary:  Positive for frequency and urgency. Negative for dysuria and hematuria. Musculoskeletal:  Negative for arthralgias, gait problem and joint swelling. Skin:  Negative for rash and wound. Allergic/Immunologic: Negative for environmental allergies and food allergies. Neurological:  Negative for dizziness, tremors, seizures, weakness, numbness and headaches. Hematological:  Negative for adenopathy. Does not bruise/bleed easily. Psychiatric/Behavioral:  Negative for behavioral problems, confusion, dysphoric mood and sleep disturbance. The patient is nervous/anxious. Has degree of PTSD and fluoxetine seems to help her   All other systems reviewed and are negative. Current Outpatient Medications:     FLUoxetine (PROZAC) 20 MG capsule, Take 1 capsule by mouth daily, Disp: 90 capsule, Rfl: 1    rosuvastatin (CRESTOR) 10 MG tablet, Take 1 tablet by mouth daily, Disp: 90 tablet, Rfl: 1  Allergies   Allergen Reactions    Fish-Derived Products        No past medical history on file.   No past surgical history on file. No family history on file. Social History       Tobacco History       Smoking Status  Never      Smokeless Tobacco Use  Never              Alcohol History       Alcohol Use Status  Yes              Drug Use       Drug Use Status  Never              Sexual Activity       Sexually Active  Not Asked                    OBJECTIVE  Vitals:    02/15/23 1527   BP: 112/68   Pulse: 100   Resp: 17   Temp: 97.8 °F (36.6 °C)   TempSrc: Temporal   SpO2: 93%   Weight: 168 lb (76.2 kg)   Height: 5' 4\" (1.626 m)        Body mass index is 28.84 kg/m². Orders Placed This Encounter   Procedures    Fecal DNA Colorectal cancer screening (Cologuard)    CBC with Auto Differential     Standing Status:   Future     Standing Expiration Date:   2/15/2024    Comprehensive Metabolic Panel     Standing Status:   Future     Standing Expiration Date:   2/15/2024    Lipid Panel     Standing Status:   Future     Standing Expiration Date:   2/15/2024    TSH     Standing Status:   Future     Standing Expiration Date:   2/15/2024    Urinalysis     Standing Status:   Future     Standing Expiration Date:   2/15/2024    EKG 12 Lead     Standing Status:   Future     Number of Occurrences:   1     Standing Expiration Date:   2/15/2024     Order Specific Question:   Reason for Exam?     Answer:   Hypertension        EXAM   Physical Exam  Vitals and nursing note reviewed. Constitutional:       Appearance: Normal appearance. She is well-developed and normal weight. HENT:      Right Ear: Tympanic membrane, ear canal and external ear normal.      Left Ear: Tympanic membrane, ear canal and external ear normal.      Nose: Congestion and rhinorrhea present. Mouth/Throat:      Pharynx: Oropharynx is clear. No posterior oropharyngeal erythema. Eyes:      General: No scleral icterus. Conjunctiva/sclera: Conjunctivae normal.      Pupils: Pupils are equal, round, and reactive to light.       Comments: May have slight icterus   Neck: Thyroid: No thyroid mass or thyromegaly. Vascular: No carotid bruit or JVD. Trachea: Trachea normal.   Cardiovascular:      Rate and Rhythm: Regular rhythm. Tachycardia present. Heart sounds: Normal heart sounds. No murmur heard. No gallop. Pulmonary:      Effort: Pulmonary effort is normal.      Breath sounds: Normal breath sounds. No wheezing, rhonchi or rales. Chest:      Chest wall: No tenderness. Abdominal:      General: Bowel sounds are normal. There is no distension. Palpations: Abdomen is soft. There is no mass. Tenderness: There is no abdominal tenderness. There is no guarding. Hernia: No hernia is present. Musculoskeletal:         General: No swelling or tenderness. Normal range of motion. Cervical back: Neck supple. No tenderness. Right lower leg: No edema. Left lower leg: No edema. Lymphadenopathy:      Cervical: No cervical adenopathy. Skin:     General: Skin is warm and dry. Capillary Refill: Capillary refill takes less than 2 seconds. Coloration: Skin is not pale. Findings: No bruising or rash. Neurological:      General: No focal deficit present. Mental Status: She is alert and oriented to person, place, and time. Sensory: No sensory deficit. Motor: No weakness or abnormal muscle tone. Coordination: Coordination normal.      Gait: Gait normal.   Psychiatric:         Mood and Affect: Mood normal.         Behavior: Behavior normal.         Maryam was seen today for medication refill. Diagnoses and all orders for this visit:    Primary hypertension  -     CBC with Auto Differential; Future  -     Comprehensive Metabolic Panel; Future  -     EKG 12 Lead; Future  -     Urinalysis; Future  -     EKG 12 Lead  Well controlled no changes made  Other hyperlipidemia  -     rosuvastatin (CRESTOR) 10 MG tablet; Take 1 tablet by mouth daily  -     Lipid Panel; Future  -     TSH;  Future  -     EKG 12 Lead; Future  - EKG 12 Lead    PTSD (post-traumatic stress disorder)  -     FLUoxetine (PROZAC) 20 MG capsule; Take 1 capsule by mouth daily    Screen for colon cancer  -     Fecal DNA Colorectal cancer screening (Cologuard)    Encounter for preventative adult health care exam with abnormal findings  -     CBC with Auto Differential; Future  -     Comprehensive Metabolic Panel; Future  -     Lipid Panel; Future  -     TSH; Future  -     Fecal DNA Colorectal cancer screening (Cologuard)  -     EKG 12 Lead; Future  -     Urinalysis; Future  -     EKG 12 Lead        No follow-ups on file.     Electronically signed by Dean Hoyos MD on 2/15/23 at 3:48 PM EST

## 2023-03-10 DIAGNOSIS — F43.10 PTSD (POST-TRAUMATIC STRESS DISORDER): ICD-10-CM

## 2023-03-10 DIAGNOSIS — E78.49 OTHER HYPERLIPIDEMIA: ICD-10-CM

## 2023-03-10 RX ORDER — FLUOXETINE HYDROCHLORIDE 20 MG/1
CAPSULE ORAL
Qty: 30 CAPSULE | Refills: 1 | OUTPATIENT
Start: 2023-03-10

## 2023-03-10 RX ORDER — ROSUVASTATIN CALCIUM 10 MG/1
TABLET, COATED ORAL
Qty: 30 TABLET | Refills: 1 | OUTPATIENT
Start: 2023-03-10

## 2023-03-15 DIAGNOSIS — Z00.01 ENCOUNTER FOR PREVENTATIVE ADULT HEALTH CARE EXAM WITH ABNORMAL FINDINGS: ICD-10-CM

## 2023-03-15 DIAGNOSIS — I10 PRIMARY HYPERTENSION: ICD-10-CM

## 2023-03-15 DIAGNOSIS — E78.49 OTHER HYPERLIPIDEMIA: ICD-10-CM

## 2023-03-15 LAB
ALBUMIN SERPL-MCNC: 4.5 G/DL (ref 3.5–5.2)
ALP SERPL-CCNC: 88 U/L (ref 35–104)
ALT SERPL-CCNC: 22 U/L (ref 0–32)
ANION GAP SERPL CALCULATED.3IONS-SCNC: 10 MMOL/L (ref 7–16)
AST SERPL-CCNC: 19 U/L (ref 0–31)
BACTERIA URNS QL MICRO: ABNORMAL /HPF
BASOPHILS # BLD: 0.05 E9/L (ref 0–0.2)
BASOPHILS NFR BLD: 0.7 % (ref 0–2)
BILIRUB SERPL-MCNC: 0.4 MG/DL (ref 0–1.2)
BILIRUB UR QL STRIP: NEGATIVE
BUN SERPL-MCNC: 13 MG/DL (ref 6–23)
CALCIUM SERPL-MCNC: 9.2 MG/DL (ref 8.6–10.2)
CHLORIDE SERPL-SCNC: 100 MMOL/L (ref 98–107)
CHOLESTEROL, TOTAL: 173 MG/DL (ref 0–199)
CLARITY UR: ABNORMAL
CO2 SERPL-SCNC: 26 MMOL/L (ref 22–29)
COLOR UR: YELLOW
CREAT SERPL-MCNC: 0.6 MG/DL (ref 0.5–1)
EOSINOPHIL # BLD: 0.07 E9/L (ref 0.05–0.5)
EOSINOPHIL NFR BLD: 1 % (ref 0–6)
ERYTHROCYTE [DISTWIDTH] IN BLOOD BY AUTOMATED COUNT: 12.8 FL (ref 11.5–15)
GLUCOSE SERPL-MCNC: 142 MG/DL (ref 74–99)
GLUCOSE UR STRIP-MCNC: NEGATIVE MG/DL
HCT VFR BLD AUTO: 40.4 % (ref 34–48)
HDLC SERPL-MCNC: 44 MG/DL
HGB BLD-MCNC: 12.7 G/DL (ref 11.5–15.5)
HGB UR QL STRIP: ABNORMAL
IMM GRANULOCYTES # BLD: 0.03 E9/L
IMM GRANULOCYTES NFR BLD: 0.4 % (ref 0–5)
KETONES UR STRIP-MCNC: NEGATIVE MG/DL
LDLC SERPL CALC-MCNC: 99 MG/DL (ref 0–99)
LEUKOCYTE ESTERASE UR QL STRIP: ABNORMAL
LYMPHOCYTES # BLD: 1.73 E9/L (ref 1.5–4)
LYMPHOCYTES NFR BLD: 24.1 % (ref 20–42)
MCH RBC QN AUTO: 29.5 PG (ref 26–35)
MCHC RBC AUTO-ENTMCNC: 31.4 % (ref 32–34.5)
MCV RBC AUTO: 94 FL (ref 80–99.9)
MONOCYTES # BLD: 0.55 E9/L (ref 0.1–0.95)
MONOCYTES NFR BLD: 7.6 % (ref 2–12)
NEUTROPHILS # BLD: 4.76 E9/L (ref 1.8–7.3)
NEUTS SEG NFR BLD: 66.2 % (ref 43–80)
NITRITE UR QL STRIP: NEGATIVE
PH UR STRIP: 6 [PH] (ref 5–9)
PLATELET # BLD AUTO: 321 E9/L (ref 130–450)
PMV BLD AUTO: 9.5 FL (ref 7–12)
POTASSIUM SERPL-SCNC: 4.5 MMOL/L (ref 3.5–5)
PROT SERPL-MCNC: 7 G/DL (ref 6.4–8.3)
PROT UR STRIP-MCNC: ABNORMAL MG/DL
RBC # BLD AUTO: 4.3 E12/L (ref 3.5–5.5)
RBC #/AREA URNS HPF: ABNORMAL /HPF (ref 0–2)
RENAL EPITHELIAL, UA: ABNORMAL /HPF
SODIUM SERPL-SCNC: 136 MMOL/L (ref 132–146)
SP GR UR STRIP: >=1.03 (ref 1–1.03)
TRIGL SERPL-MCNC: 152 MG/DL (ref 0–149)
TSH SERPL-MCNC: 1.6 UIU/ML (ref 0.27–4.2)
UROBILINOGEN UR STRIP-ACNC: 0.2 E.U./DL
VLDLC SERPL CALC-MCNC: 30 MG/DL
WBC # BLD: 7.2 E9/L (ref 4.5–11.5)
WBC #/AREA URNS HPF: ABNORMAL /HPF (ref 0–5)

## 2023-07-26 ENCOUNTER — OFFICE VISIT (OUTPATIENT)
Dept: OBGYN | Age: 63
End: 2023-07-26
Payer: COMMERCIAL

## 2023-07-26 VITALS
DIASTOLIC BLOOD PRESSURE: 71 MMHG | SYSTOLIC BLOOD PRESSURE: 124 MMHG | BODY MASS INDEX: 28.67 KG/M2 | HEART RATE: 85 BPM | WEIGHT: 167 LBS

## 2023-07-26 DIAGNOSIS — Z01.419 ENCOUNTER FOR GYNECOLOGICAL EXAMINATION: Primary | ICD-10-CM

## 2023-07-26 DIAGNOSIS — N64.4 BREAST PAIN, LEFT: ICD-10-CM

## 2023-07-26 PROCEDURE — 99386 PREV VISIT NEW AGE 40-64: CPT | Performed by: OBSTETRICS & GYNECOLOGY

## 2023-07-28 ENCOUNTER — TELEPHONE (OUTPATIENT)
Dept: GENERAL RADIOLOGY | Age: 63
End: 2023-07-28

## 2023-08-01 LAB
GYNECOLOGY CYTOLOGY REPORT: NORMAL
HPV SAMPLE: ABNORMAL
HPV SOURCE: ABNORMAL
HPV, GENOTYPE 16: NOT DETECTED
HPV, GENOTYPE 18: NOT DETECTED
HPV, HIGH RISK OTHER: DETECTED
HPV, INTERPRETATION: ABNORMAL

## 2023-09-01 ENCOUNTER — HOSPITAL ENCOUNTER (OUTPATIENT)
Dept: GENERAL RADIOLOGY | Age: 63
End: 2023-09-01
Attending: OBSTETRICS & GYNECOLOGY
Payer: COMMERCIAL

## 2023-09-01 ENCOUNTER — HOSPITAL ENCOUNTER (OUTPATIENT)
Dept: GENERAL RADIOLOGY | Age: 63
Discharge: HOME | End: 2023-09-01
Attending: OBSTETRICS & GYNECOLOGY
Payer: COMMERCIAL

## 2023-09-01 VITALS — HEIGHT: 64 IN | BODY MASS INDEX: 28.85 KG/M2 | WEIGHT: 169 LBS

## 2023-09-01 DIAGNOSIS — N64.4 BREAST PAIN, LEFT: ICD-10-CM

## 2023-09-01 DIAGNOSIS — R92.8 ABNORMAL MAMMOGRAM: Primary | ICD-10-CM

## 2023-09-01 PROCEDURE — G0279 TOMOSYNTHESIS, MAMMO: HCPCS

## 2023-09-06 ENCOUNTER — OFFICE VISIT (OUTPATIENT)
Dept: OBGYN | Age: 63
End: 2023-09-06

## 2023-09-06 VITALS
SYSTOLIC BLOOD PRESSURE: 126 MMHG | WEIGHT: 170.6 LBS | HEART RATE: 80 BPM | DIASTOLIC BLOOD PRESSURE: 78 MMHG | BODY MASS INDEX: 29.28 KG/M2

## 2023-09-06 DIAGNOSIS — N84.1 CERVICAL POLYP: Primary | ICD-10-CM

## 2023-09-06 NOTE — PROGRESS NOTES
Here today for Cervical Polypectomy. Instructed on the procedure, voiced understanding and permit signed for Cervical polypectomy. Prepared for procedure. Time out done by Dr. Sanjeev Rueda prior to starting the procedure. Tolerated procedure. No bleeding noted after procedure. Biopsy obtained, labeled and sent to lab   To return in one week for results. Discharge instructions reviewed verbally and written AVS given to patient. Voiced understanding and agreement.   No baths, tampons, intercourse and nothing in the vagina for 48 hours

## 2023-09-11 ENCOUNTER — HOSPITAL ENCOUNTER (OUTPATIENT)
Dept: GENERAL RADIOLOGY | Age: 63
Discharge: HOME | End: 2023-09-13
Payer: COMMERCIAL

## 2023-09-11 DIAGNOSIS — R92.8 ABNORMAL MAMMOGRAM: ICD-10-CM

## 2023-09-11 PROCEDURE — 88360 TUMOR IMMUNOHISTOCHEM/MANUAL: CPT

## 2023-09-11 PROCEDURE — 88342 IMHCHEM/IMCYTCHM 1ST ANTB: CPT

## 2023-09-11 PROCEDURE — 88341 IMHCHEM/IMCYTCHM EA ADD ANTB: CPT

## 2023-09-11 PROCEDURE — 88305 TISSUE EXAM BY PATHOLOGIST: CPT

## 2023-09-11 PROCEDURE — 19081 BX BREAST 1ST LESION STRTCTC: CPT

## 2023-09-11 NOTE — PROGRESS NOTES
Blood pressure 92/62 in right arm semi-supine after breast biopsy. Repeat after 15 minutes was 98/64 in right arm sitting. Patient drinking water. States she is feeling much better and is okay to leave clinic.

## 2023-09-11 NOTE — PROGRESS NOTES
Met with patient prior to her breast biopsy. Instructed on stereotactic breast biopsy procedure. Denies use of blood thinners or aspirin products within the past 5 days. Instructed that results will be available in approximately 3-5 business days. She confirms that she has an active [employer] account and is agreeable to discussion of breast biopsy results by phone. Instructed that her physician will also be notified of results. Provided with folder containing my contact information and post biopsy discharge instructions. Instructed to call me if she has any questions or concerns about her biopsy. Verbalizes understanding.   Electronically signed by Rita Rodriguez RN, BSN on 9/11/2023 at 12:31 PM

## 2023-09-12 LAB — SURGICAL PATHOLOGY REPORT: NORMAL

## 2023-09-15 ENCOUNTER — TELEPHONE (OUTPATIENT)
Dept: GENERAL RADIOLOGY | Age: 63
End: 2023-09-15

## 2023-09-15 LAB — SURGICAL PATHOLOGY REPORT: NORMAL

## 2023-09-15 NOTE — TELEPHONE ENCOUNTER
Maryam returned my call. Instructed that the pathology report from her left breast biopsy indicates DCIS. Instructed on definition of DCIS and reassured that DCIS is not life threatening. Instructed that her physician has been notified of the results and should be in contact with her regarding next steps. Instructed that per NCCN guidelines, next step is most commonly, breast surgery followed by radiation therapy (if lumpectomy) and possibly Tamoxifen therapy. Verbalizes understanding. Breast pathology report routed to Dr. Cata Carias.  Electronically signed by Concetta Moya RN, BSN on 9/15/2023 at 5:03 PM

## 2023-09-15 NOTE — TELEPHONE ENCOUNTER
Left voicemail message to call me (regarding breast biopsy results).  Electronically signed by Ravinder Chiang RN, BSN on 9/15/2023 at 3:35 PM

## 2023-09-18 ENCOUNTER — TELEPHONE (OUTPATIENT)
Dept: BREAST CENTER | Age: 63
End: 2023-09-18

## 2023-09-18 NOTE — TELEPHONE ENCOUNTER
Patient is a new referral to the breast clinic. Left message for patient to return call at [phone] to discuss referral information and schedule an appointment in the breast clinic. Patient is a new left breast DCIS.      Electronically signed by Missy Do RN on 9/18/23 at 8:49 AM EDT

## 2023-09-18 NOTE — TELEPHONE ENCOUNTER
Patient returned our call. Discussed referral information. Appointment given for 10/5/23 with an arrival time of 0900 with Dr Reagan Leiva.     Electronically signed by Froy Davis RN on 9/18/23 at 1:15 PM EDT

## 2023-09-21 ENCOUNTER — TELEPHONE (OUTPATIENT)
Dept: OBGYN | Age: 63
End: 2023-09-21

## 2023-09-21 NOTE — TELEPHONE ENCOUNTER
Patient called in wanting to discuss medication. Does she need to have an appoiontment or can she do it over the phone?

## 2023-10-05 ENCOUNTER — OFFICE VISIT (OUTPATIENT)
Dept: BREAST CENTER | Age: 63
End: 2023-10-05
Payer: COMMERCIAL

## 2023-10-05 VITALS
WEIGHT: 173 LBS | HEIGHT: 64 IN | DIASTOLIC BLOOD PRESSURE: 68 MMHG | SYSTOLIC BLOOD PRESSURE: 112 MMHG | BODY MASS INDEX: 29.53 KG/M2 | TEMPERATURE: 97.8 F | OXYGEN SATURATION: 97 % | HEART RATE: 88 BPM | RESPIRATION RATE: 20 BRPM

## 2023-10-05 DIAGNOSIS — D05.12 DUCTAL CARCINOMA IN SITU (DCIS) OF LEFT BREAST: Primary | ICD-10-CM

## 2023-10-05 PROCEDURE — 99203 OFFICE O/P NEW LOW 30 MIN: CPT | Performed by: SURGERY

## 2023-10-05 PROCEDURE — 99204 OFFICE O/P NEW MOD 45 MIN: CPT | Performed by: SURGERY

## 2023-10-05 NOTE — PATIENT INSTRUCTIONS
Patient will be scheduled for surgery in about 6 weeks - once hematoma resolves   -- LEFT BREAST MAG SEED LOCALIZED LUMPECTOMY

## 2023-11-10 ENCOUNTER — PREP FOR PROCEDURE (OUTPATIENT)
Dept: BREAST CENTER | Age: 63
End: 2023-11-10

## 2023-11-10 DIAGNOSIS — D05.12 INTRADUCTAL CARCINOMA IN SITU OF LEFT BREAST: ICD-10-CM

## 2023-11-13 DIAGNOSIS — D05.12 DUCTAL CARCINOMA IN SITU (DCIS) OF LEFT BREAST: Primary | ICD-10-CM

## 2023-11-17 DIAGNOSIS — D05.12 DUCTAL CARCINOMA IN SITU (DCIS) OF LEFT BREAST: Primary | ICD-10-CM

## 2023-11-25 RX ORDER — SODIUM CHLORIDE 9 MG/ML
INJECTION, SOLUTION INTRAVENOUS PRN
Status: CANCELLED | OUTPATIENT
Start: 2023-11-25

## 2023-11-25 RX ORDER — SODIUM CHLORIDE 0.9 % (FLUSH) 0.9 %
5-40 SYRINGE (ML) INJECTION EVERY 12 HOURS SCHEDULED
Status: CANCELLED | OUTPATIENT
Start: 2023-11-25

## 2023-11-25 RX ORDER — SODIUM CHLORIDE, SODIUM LACTATE, POTASSIUM CHLORIDE, CALCIUM CHLORIDE 600; 310; 30; 20 MG/100ML; MG/100ML; MG/100ML; MG/100ML
INJECTION, SOLUTION INTRAVENOUS CONTINUOUS
Status: CANCELLED | OUTPATIENT
Start: 2023-11-25

## 2023-11-25 RX ORDER — SODIUM CHLORIDE 0.9 % (FLUSH) 0.9 %
5-40 SYRINGE (ML) INJECTION PRN
Status: CANCELLED | OUTPATIENT
Start: 2023-11-25

## 2023-11-26 NOTE — H&P
HISTORY OF PRESENT ILLNESS  Maryam Mojica presents for surgical management of left breast DCIS.    Maryam noted some breast discomfort and imaging studies were obtained.  Calcifications in the left breast underwent image guided biopsy showing the presence of DCIS.    BREAST SYMPTOMS  Maryam continues to have sensitivity but no other symptoms.    PAST BREAST HISTORY  Maryam    PAST MEDICAL/SURGICAL HISTORY  Past Medical History:   Diagnosis Date    Anxiety     Breast cancer (HCC)     dcis left    Depression     HLD (hyperlipidemia)      Past Surgical History:   Procedure Laterality Date     SECTION      JUANITA STEROTACTIC LOC BREAST BIOPSY LEFT Left 2023    JUANITA STEROTACTIC LOC BREAST BIOPSY LEFT 2023 [facility]    TONSILLECTOMY      TUBAL LIGATION      during c/s       FAMILY HISTORY  There is no family history of breast or ovary cancer.    MEDICATIONS    Current Outpatient Medications:     FLUoxetine (PROZAC) 20 MG capsule, Take 1 capsule by mouth daily, Disp: 90 capsule, Rfl: 1    rosuvastatin (CRESTOR) 10 MG tablet, Take 1 tablet by mouth daily, Disp: 90 tablet, Rfl: 1    Multiple Vitamins-Minerals (CENTRUM SILVER 50+WOMEN PO), Take by mouth, Disp: , Rfl:     ALLERGIES  Allergies   Allergen Reactions    Fish-Derived Products        SOCIAL HISTORY   reports that she has never smoked. She has never used smokeless tobacco. She reports current alcohol use. She reports that she does not use drugs.    REVIEW OF SYSTEMS  Maryam reports that she is active.  She can ambulate stairs without chest pain or shortness of breath.  She has no palpitations.  She has no pedal edema.  She has no shortness of breath at rest or cough.  She notes no abdominal pain reflux symptoms or change in her bowel habits.  She has no hematuria dysuria.  She has no headaches syncope numbness weakness or paresthesias.  She has no new or worsening bone or joint pain.    PERIOPE the RATIVE ISSUES  Maryam has had no significant sequela with prior

## 2023-11-27 ENCOUNTER — HOSPITAL ENCOUNTER (OUTPATIENT)
Dept: GENERAL RADIOLOGY | Age: 63
Discharge: HOME | End: 2023-11-29
Payer: COMMERCIAL

## 2023-11-27 DIAGNOSIS — D05.12 DUCTAL CARCINOMA IN SITU (DCIS) OF LEFT BREAST: ICD-10-CM

## 2023-11-27 PROCEDURE — A4648 IMPLANTABLE TISSUE MARKER: HCPCS

## 2023-11-27 NOTE — PROGRESS NOTES
[address]   PRE-ADMISSION TESTING GENERAL INSTRUCTIONS  [facility] Phone Number: [phone]      GENERAL INSTRUCTIONS:    [x] Antibacterial Soap Shower Night before and/or AM of surgery. [] CHG Wipes instruction sheet and wipes given. [] Hibiclens shower the night before AND the morning of surgery.   -Do not use Hibiclens on your face or head. [x] Do not wear makeup, lotions, powders, deodorant. [x] Nothing by mouth after midnight; including gum, candy, mints, or water. [x] You may brush your teeth, gargle, but do NOT swallow water. [x] No tobacco products, illegal drugs, or alcohol within 24 hours of your surgery. [x] Jewelry or valuables should not be brought to the hospital. All body and/or tongue piercing's must be removed prior to arriving to hospital. No contact lens or hair pins. [x] Arrange transportation with a responsible adult  to and from the hospital. Arrange for someone to be with you for the remainder of the day and for 24 hours after your procedure due to having had anesthesia. -Who will be your  for transportation?         -Who will be staying with you for 24 hrs after your procedure?   [x] Bring insurance card and photo ID.  [] Bring copy of living will or healthcare power of  papers to be placed in your electronic record. [] Urine Pregnancy test will be preformed the day of surgery. A specimen sample may be brought from home. [] Transfusion Quan Lucero) Bracelet: Please bring with you to hospital, day of surgery. PARKING INSTRUCTIONS:     [x] ARRIVAL DATE & TIME: 12/1/23      4330  [x] Times are subject to change. We will contact you the business day before surgery if that were to occur. [x] Enter into the [employer]. Two people may accompany you. Masks are not required. [x] Parking Lot \"I\" is where you will park. It is located on the corner of [address] [place] [address] and [address] [place] [address].  The entrance is on [employer]. Only one vehicle - per patient, is permitted in parking lot. Upon entering the parking lot, a voucher ticket will print. MEDICATION INSTRUCTIONS:    [x] Bring a complete list of your medications, please write the last time you took the medicine, give this list to the nurse in Pre-Op. [x] Take only the following medications the morning of surgery with 1-2 ounces of water: Prozac (if needed)  [x] Stop all herbal supplements and vitamins 5 days before surgery. Stop NSAIDS 7 days before surgery. [x] DO NOT take any diabetic medicine the morning of surgery. Follow instructions for insulin the day before surgery. [x] If you are diabetic and your blood sugar is low or you feel symptomatic, you may drink 1-2 ounces of apple juice or take a glucose tablet.            -The morning of your procedure, you may call the pre-op area if you have concerns about your blood sugar [phone]. [x] Use your inhalers the morning of surgery. Bring your emergency inhaler with you day of surgery. [x] Follow physician instructions regarding any blood thinners you may be taking. WHAT TO EXPECT:    [x] The day of surgery you will be greeted and checked in by the [employer].  In addition, you will be registered in the [facility] by a Patient Access Representative. Please bring your photo ID and insurance card. A nurse will greet you in accordance to the time you are needed in the pre-op area to prepare you for surgery. Please do not be discouraged if you are not greeted in the order you arrive as there are many variables that are involved in patient preparation. Your patience is greatly appreciated as you wait for your nurse. [x]  Delays may occur with surgery and staff will make a sincere effort to keep you informed of delays. If any delays occur with your procedure, we apologize ahead of time for your inconvenience as we recognize the value of your time.

## 2023-11-29 ENCOUNTER — TELEPHONE (OUTPATIENT)
Dept: BREAST CENTER | Age: 63
End: 2023-11-29

## 2023-11-29 NOTE — TELEPHONE ENCOUNTER
Patient surgery has been scheduled with surgery scheduling 12/1/23  @ 11:30am / Arrival 9:30am / Shayy Banco seed placement - 11/27/23 @ 9:00am [facility] - Left breast mag seed localized lumpectomy - [facility]  [facility]. Patient notified of date and time of surgery. Patient was instructed to enter the [facility] entrance of the hospital. Patient was instructed NPO after midnight the night prior to surgery. Patient was instructed NO ASA products or blood thinners 3-5days prior to surgery. Patient was instructed to bring a sports bra with her day of surgery. Post op 12/12/23 @ 11am [facility]. Preadmission testing will contact patient prior to surgery, to go over medications and any additional testing that may need to be completed. No prior authorization required.

## 2023-11-30 ENCOUNTER — ANESTHESIA EVENT (OUTPATIENT)
Dept: OPERATING ROOM | Age: 63
End: 2023-11-30
Payer: COMMERCIAL

## 2023-12-01 ENCOUNTER — ANESTHESIA (OUTPATIENT)
Dept: OPERATING ROOM | Age: 63
End: 2023-12-01
Payer: COMMERCIAL

## 2023-12-01 ENCOUNTER — HOSPITAL ENCOUNTER (OUTPATIENT)
Dept: GENERAL RADIOLOGY | Age: 63
Setting detail: OUTPATIENT SURGERY
End: 2023-12-01
Attending: SURGERY
Payer: COMMERCIAL

## 2023-12-01 ENCOUNTER — HOSPITAL ENCOUNTER (OUTPATIENT)
Age: 63
Setting detail: OUTPATIENT SURGERY
Discharge: HOME | End: 2023-12-01
Attending: SURGERY | Admitting: SURGERY
Payer: COMMERCIAL

## 2023-12-01 VITALS
OXYGEN SATURATION: 96 % | TEMPERATURE: 97.5 F | SYSTOLIC BLOOD PRESSURE: 112 MMHG | BODY MASS INDEX: 29.37 KG/M2 | HEIGHT: 64 IN | HEART RATE: 66 BPM | WEIGHT: 172 LBS | RESPIRATION RATE: 12 BRPM | DIASTOLIC BLOOD PRESSURE: 68 MMHG

## 2023-12-01 DIAGNOSIS — D05.12 INTRADUCTAL CARCINOMA IN SITU OF LEFT BREAST: ICD-10-CM

## 2023-12-01 DIAGNOSIS — D05.12 DUCTAL CARCINOMA IN SITU (DCIS) OF LEFT BREAST: Primary | ICD-10-CM

## 2023-12-01 DIAGNOSIS — D05.12 DUCTAL CARCINOMA IN SITU (DCIS) OF LEFT BREAST: ICD-10-CM

## 2023-12-01 PROCEDURE — 3700000000 HC ANESTHESIA ATTENDED CARE: Performed by: SURGERY

## 2023-12-01 PROCEDURE — 88307 TISSUE EXAM BY PATHOLOGIST: CPT

## 2023-12-01 PROCEDURE — 6360000002 HC RX W HCPCS: Performed by: SURGERY

## 2023-12-01 PROCEDURE — 2720000010 HC SURG SUPPLY STERILE: Performed by: SURGERY

## 2023-12-01 PROCEDURE — 2580000003 HC RX 258

## 2023-12-01 PROCEDURE — 2580000003 HC RX 258: Performed by: SURGERY

## 2023-12-01 PROCEDURE — 76098 X-RAY EXAM SURGICAL SPECIMEN: CPT

## 2023-12-01 PROCEDURE — 3600000016 HC SURGERY LEVEL 6 ADDTL 15MIN: Performed by: SURGERY

## 2023-12-01 PROCEDURE — 7100000011 HC PHASE II RECOVERY - ADDTL 15 MIN: Performed by: SURGERY

## 2023-12-01 PROCEDURE — 19301 PARTIAL MASTECTOMY: CPT | Performed by: SURGERY

## 2023-12-01 PROCEDURE — 3700000001 HC ADD 15 MINUTES (ANESTHESIA): Performed by: SURGERY

## 2023-12-01 PROCEDURE — 3600000006 HC SURGERY LEVEL 6 BASE: Performed by: SURGERY

## 2023-12-01 PROCEDURE — 2500000003 HC RX 250 WO HCPCS: Performed by: SURGERY

## 2023-12-01 PROCEDURE — 7100000010 HC PHASE II RECOVERY - FIRST 15 MIN: Performed by: SURGERY

## 2023-12-01 PROCEDURE — 6360000002 HC RX W HCPCS

## 2023-12-01 PROCEDURE — 2709999900 HC NON-CHARGEABLE SUPPLY: Performed by: SURGERY

## 2023-12-01 RX ORDER — PROPOFOL 10 MG/ML
INJECTION, EMULSION INTRAVENOUS CONTINUOUS PRN
Status: DISCONTINUED | OUTPATIENT
Start: 2023-12-01 | End: 2023-12-01 | Stop reason: SDUPTHER

## 2023-12-01 RX ORDER — LIDOCAINE HYDROCHLORIDE 20 MG/ML
INJECTION, SOLUTION INTRAVENOUS PRN
Status: DISCONTINUED | OUTPATIENT
Start: 2023-12-01 | End: 2023-12-01 | Stop reason: SDUPTHER

## 2023-12-01 RX ORDER — SODIUM CHLORIDE 0.9 % (FLUSH) 0.9 %
5-40 SYRINGE (ML) INJECTION PRN
Status: DISCONTINUED | OUTPATIENT
Start: 2023-12-01 | End: 2023-12-01 | Stop reason: HOSPADM

## 2023-12-01 RX ORDER — MIDAZOLAM HYDROCHLORIDE 1 MG/ML
INJECTION INTRAMUSCULAR; INTRAVENOUS PRN
Status: DISCONTINUED | OUTPATIENT
Start: 2023-12-01 | End: 2023-12-01 | Stop reason: SDUPTHER

## 2023-12-01 RX ORDER — SODIUM CHLORIDE, SODIUM LACTATE, POTASSIUM CHLORIDE, CALCIUM CHLORIDE 600; 310; 30; 20 MG/100ML; MG/100ML; MG/100ML; MG/100ML
INJECTION, SOLUTION INTRAVENOUS CONTINUOUS
Status: DISCONTINUED | OUTPATIENT
Start: 2023-12-01 | End: 2023-12-01 | Stop reason: HOSPADM

## 2023-12-01 RX ORDER — SODIUM CHLORIDE 9 MG/ML
INJECTION, SOLUTION INTRAVENOUS PRN
Status: DISCONTINUED | OUTPATIENT
Start: 2023-12-01 | End: 2023-12-01 | Stop reason: HOSPADM

## 2023-12-01 RX ORDER — ACETAMINOPHEN AND CODEINE PHOSPHATE 300; 30 MG/1; MG/1
1 TABLET ORAL EVERY 4 HOURS PRN
Qty: 18 TABLET | Refills: 0 | Status: SHIPPED | OUTPATIENT
Start: 2023-12-01 | End: 2023-12-04

## 2023-12-01 RX ORDER — SODIUM CHLORIDE 0.9 % (FLUSH) 0.9 %
5-40 SYRINGE (ML) INJECTION EVERY 12 HOURS SCHEDULED
Status: DISCONTINUED | OUTPATIENT
Start: 2023-12-01 | End: 2023-12-01 | Stop reason: HOSPADM

## 2023-12-01 RX ORDER — SODIUM CHLORIDE 9 MG/ML
INJECTION, SOLUTION INTRAVENOUS CONTINUOUS PRN
Status: DISCONTINUED | OUTPATIENT
Start: 2023-12-01 | End: 2023-12-01 | Stop reason: SDUPTHER

## 2023-12-01 RX ORDER — FENTANYL CITRATE 50 UG/ML
INJECTION, SOLUTION INTRAMUSCULAR; INTRAVENOUS PRN
Status: DISCONTINUED | OUTPATIENT
Start: 2023-12-01 | End: 2023-12-01 | Stop reason: SDUPTHER

## 2023-12-01 RX ORDER — ONDANSETRON 2 MG/ML
INJECTION INTRAMUSCULAR; INTRAVENOUS PRN
Status: DISCONTINUED | OUTPATIENT
Start: 2023-12-01 | End: 2023-12-01 | Stop reason: SDUPTHER

## 2023-12-01 RX ADMIN — PHENYLEPHRINE HYDROCHLORIDE 100 MCG: 10 INJECTION INTRAVENOUS at 12:32

## 2023-12-01 RX ADMIN — ONDANSETRON HYDROCHLORIDE 4 MG: 2 SOLUTION INTRAMUSCULAR; INTRAVENOUS at 12:25

## 2023-12-01 RX ADMIN — PHENYLEPHRINE HYDROCHLORIDE 100 MCG: 10 INJECTION INTRAVENOUS at 12:13

## 2023-12-01 RX ADMIN — CEFAZOLIN 2000 MG: 2 INJECTION, POWDER, FOR SOLUTION INTRAMUSCULAR; INTRAVENOUS at 11:45

## 2023-12-01 RX ADMIN — FENTANYL CITRATE 100 MCG: 50 INJECTION, SOLUTION INTRAMUSCULAR; INTRAVENOUS at 11:50

## 2023-12-01 RX ADMIN — PHENYLEPHRINE HYDROCHLORIDE 100 MCG: 10 INJECTION INTRAVENOUS at 12:22

## 2023-12-01 RX ADMIN — MIDAZOLAM 1 MG: 1 INJECTION INTRAMUSCULAR; INTRAVENOUS at 11:50

## 2023-12-01 RX ADMIN — SODIUM CHLORIDE: 9 INJECTION, SOLUTION INTRAVENOUS at 09:45

## 2023-12-01 RX ADMIN — LIDOCAINE HYDROCHLORIDE 100 MG: 20 INJECTION, SOLUTION INTRAVENOUS at 11:50

## 2023-12-01 RX ADMIN — PROPOFOL 100 MCG/KG/MIN: 10 INJECTION, EMULSION INTRAVENOUS at 11:50

## 2023-12-01 RX ADMIN — MIDAZOLAM 1 MG: 1 INJECTION INTRAMUSCULAR; INTRAVENOUS at 11:45

## 2023-12-01 RX ADMIN — SODIUM CHLORIDE: 9 INJECTION, SOLUTION INTRAVENOUS at 11:45

## 2023-12-01 ASSESSMENT — PAIN - FUNCTIONAL ASSESSMENT: PAIN_FUNCTIONAL_ASSESSMENT: 0-10

## 2023-12-01 ASSESSMENT — PAIN SCALES - GENERAL: PAINLEVEL_OUTOF10: 0

## 2023-12-01 NOTE — DISCHARGE INSTRUCTIONS
Breast Surgery Post-Op Instructions: LUMPECTOMY +/- SENTINEL NODE BIOPSY      Diet: No restrictions    Activity:  The recommendation is for light activity only for 2 weeks. Please wear a supportive bra for the majority of the time including bed time. You will be able to perform all the minor activities of daily living on your own. It is recommended that you avoid strenuous activity which would include housework, yard work and exercise. If you experience any excessive coughing or vomiting related to the procedure or anesthesia please call us in that this could increase the chance of complications    Return to work: If your job is strenuous the recommendation is you take at least 2 weeks off of work. You can contact the office in the next several days for instructions regarding returning to work. Driving: The recommendation is that you avoid driving for 2 days. You should not drive if you are still taking pain medication. Bathing/showering: It is okay if the incision(s) get wet or exposed to soap in 24 hrs. Until then the recommendation is that you keep it dry. When you do take your 1st bath or shower do not scrub the incision(s). The recommendation is that you simply pat it dry. Incision maintenance: There is no maintenance required for your incision(s). What to look for: There is a small chance you will develop either a seroma, an infection or hematoma. Evidence that you might be experiencing a problem will be swelling and/or redness and/or bruising and/or worsening pain. A seroma is simply a collection of fluid at the surgical site. There will be swelling but there usually is no discoloration. An infection will result in swelling, redness and increased pain. A hematoma is a collection of blood at the surgical site. This will result in swelling, a purple discoloration, firmness and increased pain. If you had a lymph node operation as a part of your surgery.   You may notice

## 2023-12-01 NOTE — INTERVAL H&P NOTE
Update History & Physical    The patient's History and Physical was reviewed with the patient and I examined the patient. There was no change in plan. The surgical site was confirmed by the patient and me. Plan: The risks, benefits, expected outcome, and alternative to the recommended procedure have been discussed with the patient. Patient understands and wants to proceed with the procedure.      Electronically signed by Lorena Park MD on 12/1/2023 at 9:33 AM

## 2023-12-01 NOTE — OP NOTE
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 Left breast Magseed lumpectomy    In the days leading up to the procedure the patient underwent successful Magseed localization. On the day of the procedure she was taken to the operating room placed supine on the operating table and sedated. A sterile field established with Hibiclens paint and the appropriate towels and drapes were on the entire left torso. We reviewed the imaging studies as well as using the mag probe to anticipate the target. Local anesthesia was infiltrated at the site of the anticipated incision. The skin was incised. We dissected toward the signal.  As we approached the signal we began to develop tissue in all directions as a lumpectomy specimen. When we felt we had an adequate amount of tissue we removed the specimen, and did for margins and obtained images. At this point the surgical site was irrigated. Local anesthesia was infiltrated. Hemostasis was obtained with cautery. The skin was closed with 304 0 Vicryl. Surgical glue was applied. The patient tolerated this procedure well.     Electronically signed by Tomi Osborne MD on 12/1/2023 at 12:41 PM

## 2023-12-01 NOTE — H&P
HISTORY OF PRESENT ILLNESS  Karin Morris presents for surgical management of left breast DCIS. Maryam noted some breast discomfort and imaging studies were obtained. Calcifications in the left breast underwent image guided biopsy showing the presence of DCIS. BREAST SYMPTOMS  Maryam continues to have sensitivity but no other symptoms. PAST BREAST HISTORY  Maryam     PAST MEDICAL/SURGICAL HISTORY  Past Medical History        Past Medical History:   Diagnosis Date    Anxiety      Breast cancer ([address])      dcis left    Depression      HLD (hyperlipidemia)           Past Surgical History         Past Surgical History:   Procedure Laterality Date     SECTION        JUANITA STEROTACTIC LOC BREAST BIOPSY LEFT Left 2023     JUANITA STEROTACTIC LOC BREAST BIOPSY LEFT 2023 [facility]    TONSILLECTOMY        TUBAL LIGATION         during c/s            FAMILY HISTORY  There is no family history of breast or ovary cancer. MEDICATIONS    Current Medication      Current Outpatient Medications:     FLUoxetine (PROZAC) 20 MG capsule, Take 1 capsule by mouth daily, Disp: 90 capsule, Rfl: 1    rosuvastatin (CRESTOR) 10 MG tablet, Take 1 tablet by mouth daily, Disp: 90 tablet, Rfl: 1    Multiple Vitamins-Minerals (CENTRUM SILVER 50+WOMEN PO), Take by mouth, Disp: , Rfl:         ALLERGIES       Allergies   Allergen Reactions    Fish-Derived Products           SOCIAL HISTORY   reports that she has never smoked. She has never used smokeless tobacco. She reports current alcohol use. She reports that she does not use drugs. REVIEW OF SYSTEMS  Maryam reports that she is active. She can ambulate stairs without chest pain or shortness of breath. She has no palpitations. She has no pedal edema. She has no shortness of breath at rest or cough. She notes no abdominal pain reflux symptoms or change in her bowel habits. She has no hematuria dysuria. She has no headaches syncope numbness weakness or paresthesias.   She

## 2023-12-01 NOTE — ANESTHESIA POSTPROCEDURE EVALUATION
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.12])    Surgeons: Rika Barry MD Responsible Provider: Vito George MD    Anesthesia Type: MAC ASA Status: 3          Anesthesia Type: MAC    Melody Phase I: Melody Score: 10    Melody Phase II: Melody Score: 10      Anesthesia Post Evaluation    Patient location during evaluation: PACU  Patient participation: complete - patient participated  Level of consciousness: awake and alert  Airway patency: patent  Nausea & Vomiting: no nausea and no vomiting  Complications: no  Cardiovascular status: blood pressure returned to baseline and hemodynamically stable  Respiratory status: acceptable and spontaneous ventilation  Hydration status: euvolemic  Multimodal analgesia pain management approach  Pain management: adequate

## 2023-12-01 NOTE — PROGRESS NOTES
CLINICAL PHARMACY NOTE: MEDS TO BEDS    Total # of Prescriptions Filled: 1   The following medications were delivered to the patient:  Acetaminophen-codeine 300-30    Additional Documentation:   nacho picked up in pharmacy

## 2023-12-06 LAB — SURGICAL PATHOLOGY REPORT: NORMAL

## 2023-12-14 ENCOUNTER — OFFICE VISIT (OUTPATIENT)
Dept: BREAST CENTER | Age: 63
End: 2023-12-14

## 2023-12-14 VITALS
WEIGHT: 171 LBS | HEART RATE: 76 BPM | HEIGHT: 64 IN | SYSTOLIC BLOOD PRESSURE: 112 MMHG | OXYGEN SATURATION: 98 % | BODY MASS INDEX: 29.19 KG/M2 | TEMPERATURE: 97.6 F | DIASTOLIC BLOOD PRESSURE: 78 MMHG | RESPIRATION RATE: 20 BRPM

## 2023-12-14 DIAGNOSIS — D05.12 INTRADUCTAL CARCINOMA IN SITU OF LEFT BREAST: Primary | ICD-10-CM

## 2023-12-14 DIAGNOSIS — Z12.31 ENCOUNTER FOR SCREENING MAMMOGRAM FOR BREAST CANCER: ICD-10-CM

## 2023-12-14 PROCEDURE — 99024 POSTOP FOLLOW-UP VISIT: CPT | Performed by: SURGERY

## 2023-12-14 NOTE — PATIENT INSTRUCTIONS
Medical oncology and radiation oncology offices will call to set up consult. Any questions or concerns, please contact the office at [phone].

## 2023-12-16 NOTE — PROGRESS NOTES
Date of visit: 12/14/2023    10/05/23  12/14/23      DIAGNOSIS:  1. 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 Breast pain    TREATMENT  1. (12/01/23) LEFT lumpectomy    IMAGING/PROCEDURES:  1. (09/01/23) BILATERAL dt-mammogram: BIRADS-4  * For pain  * LEFT UOQ calcifications  2. (09/11/23) LEFT stereotactic biopsy    Breast History  Magdalena Kendrick is in the office for a postoperative visit. Maryam recently underwent an uneventful left breast lumpectomy for ductal carcinoma in situ. Breast Symptoms  Maryam has no significant discomfort or disability. Breast Examination  The incision in the left breast is healing nicely with no evidence of infection hematoma or seroma. Pathology  I discussed the surgical pathology report with Maryam. The surgical specimen showed 6 mm of DCIS margins were negative. Her image guided biopsy demonstrated this DCIS to be ER positive. Assessment/Plan  Maryam Bustamante has completed the surgical management of left breast cancer. I informed Maryam that she will need to meet with both medical oncology and radiation oncology. I did make those referrals. If there are no issues the patient will have breast imaging in the ER and see us in the office at that time. This note was created with voice recognition software. Please excuse any grammatical errors that were not corrected and please contact me if there are any questions. [email or web address]  [phone]

## 2023-12-28 ENCOUNTER — HOSPITAL ENCOUNTER (OUTPATIENT)
Dept: RADIATION ONCOLOGY | Age: 63
Discharge: HOME | End: 2023-12-28
Payer: COMMERCIAL

## 2023-12-28 VITALS
TEMPERATURE: 97.2 F | BODY MASS INDEX: 30.04 KG/M2 | SYSTOLIC BLOOD PRESSURE: 128 MMHG | DIASTOLIC BLOOD PRESSURE: 74 MMHG | HEART RATE: 98 BPM | RESPIRATION RATE: 18 BRPM | WEIGHT: 175 LBS

## 2023-12-28 DIAGNOSIS — D05.12 DUCTAL CARCINOMA IN SITU (DCIS) OF LEFT BREAST: Primary | ICD-10-CM

## 2023-12-28 PROCEDURE — 99205 OFFICE O/P NEW HI 60 MIN: CPT | Performed by: RADIOLOGY

## 2023-12-28 PROCEDURE — 99205 OFFICE O/P NEW HI 60 MIN: CPT

## 2023-12-28 NOTE — PROGRESS NOTES
thyromegaly  Respiratory: Chest was symmetrical without dullness to percussion. Breath sounds bilaterally were clear to auscultation. No wheezes, rhonchi or rales. Breasts: Partial mastectomy scar on the left nicely healed. No masses palpated bilaterally  Cardiovascular: Regular without murmur. Abdomen: Obese, rounded and soft without organomegaly. No rebound, guarding or  rigidity. Lymphatic: No lymphadenopathy. Musculoskeletal: Ambulates without assistance. Normal curvature of the spine. No gross muscle weakness. Muscle size, tone and strength are normal. No involuntary movements. Extremities:  No lower extremity edema, ulcerations, tenderness, varicosities or erythema. Coordination appears adequate. Sensory function appears intact. Skin:  Warm and dry. Examination of color, turgor and pigmentation was relatively normal. No bruises or skin rashes. Neurological/Psychiatric: General behavior, level of consciousness, thought content is normal. The patient's emotional status is normal.  Cranial nerves II-XII are grossly intact. RADIATION SAFETY AND ONCOLOGIC TREATMENT SUPPORT:    - Previous radiation history:  No  - History of autoimmune or connective tissue disease:  No  - Nutritional support/ PEG:  Not applicable  - Dental evaluation:  Not applicable  -  requested:  Not asked for.  - Oncology Nurse navigator requested:  - Transportation for daily treatment:  Self    TUMOR MARKERS: No results found for: \"PSA\", \"CEA\", \"\", [id number]", [id number]"    IMAGING REVIEWED:  No results found. PATHOLOGY REVIEWED:      IMPRESSION:     The patient presents with a small ER/AR positive, grade 3 DCIS of the left breast, SP partial mastectomy. In theory she would be a candidate for adjuvant radiation therapy, but the benefit of radiation therapy might be low in terms of adding to the local control of surgery.     DISCUSSION/PLAN:     I have discussed at length with the patient natural
considered. PELVIC FLOOR DYSFUNCTION SCREENING ASSESSMENT    - Do you have any pelvic pain? No     - Do you have any fecal constipation or fecal incontinence? No     - Do you have any urinary incontinence or difficulty starting to urinate? No     ARE ANY OF THE ABOVE ARE ANSWERED YES: No          PREHAB AUDIOLOGY REFERRAL    - Is patient planned to receive Cisplatin? No. This patient is not planned to start Cisplatin. - Is patient planned to receive radiation therapy that may be directed toward auditory canals or nerves? No. Patient is not planned to start radiation therapy to auditory canals or nerves. - Is patient complaining of new onset hearing loss? No. Patient is not complaining of new onset hearing loss. Patient education given on radiation therapy side effects and fall prevention safety utilizing slides and handouts. The patient expresses understanding and acceptance of instructions.  Chad Roger RN 12/28/2023 8:13 AM           Chad Roger RN

## 2024-01-08 LAB — SURGICAL PATHOLOGY REPORT: NORMAL

## 2024-01-09 ENCOUNTER — TELEPHONE (OUTPATIENT)
Dept: RADIATION ONCOLOGY | Age: 64
End: 2024-01-09

## 2024-01-09 NOTE — TELEPHONE ENCOUNTER
This RN called patient per Dr Yoo's request to inform her DCISionRT result is back and she doesn't need radiation treatment, no answer I left a VM.

## 2024-03-13 DIAGNOSIS — F43.10 PTSD (POST-TRAUMATIC STRESS DISORDER): ICD-10-CM

## 2024-03-13 RX ORDER — FLUOXETINE HYDROCHLORIDE 20 MG/1
20 CAPSULE ORAL DAILY
Qty: 30 CAPSULE | Refills: 1 | Status: SHIPPED | OUTPATIENT
Start: 2024-03-13

## 2024-03-13 NOTE — TELEPHONE ENCOUNTER
Last Appointment:  2/15/2023  Future Appointments   Date Time Provider Department Center   10/14/2024  8:00 AM ALYSONYX GREGG IMAGING [facility]   10/14/2024  8:30 AM Chandrika Gutierrez APRN - [facility] COL [facility]TO[facility] [[email or web address]

## 2024-03-21 ENCOUNTER — TELEPHONE (OUTPATIENT)
Dept: RADIATION ONCOLOGY | Age: 64
End: 2024-03-21

## 2024-03-21 NOTE — TELEPHONE ENCOUNTER
Was on the phone with her insurance this morning, I was given another # to call for [employer] [place] [phone], I was on the phone for more than 25 minutes.  I was told that this claim # [id number] status are the following:  received: 01/10/2024; processed 01/13/24; and a check # [id number] was issued on 01/18/2024 with the amount of $ 5,995.00 and the check was sent to the member/patient.  After this information, I asked what's next we need to do, I was on hold for a long time and then the phone hung up on me.  I then called Maryam to give her an update of the information I was given, she appreciated it and she states she will make some phone calls based on this new informations.

## 2024-03-22 ENCOUNTER — TELEPHONE (OUTPATIENT)
Dept: RADIATION ONCOLOGY | Age: 64
End: 2024-03-22

## 2024-03-22 NOTE — TELEPHONE ENCOUNTER
Tried contacting patient's insurance again with the new # she gave me, without any new information.

## 2024-04-29 ASSESSMENT — PATIENT HEALTH QUESTIONNAIRE - PHQ9
8. MOVING OR SPEAKING SO SLOWLY THAT OTHER PEOPLE COULD HAVE NOTICED. OR THE OPPOSITE - BEING SO FIDGETY OR RESTLESS THAT YOU HAVE BEEN MOVING AROUND A LOT MORE THAN USUAL: NOT AT ALL
9. THOUGHTS THAT YOU WOULD BE BETTER OFF DEAD, OR OF HURTING YOURSELF: NOT AT ALL
10. IF YOU CHECKED OFF ANY PROBLEMS, HOW DIFFICULT HAVE THESE PROBLEMS MADE IT FOR YOU TO DO YOUR WORK, TAKE CARE OF THINGS AT HOME, OR GET ALONG WITH OTHER PEOPLE: NOT DIFFICULT AT ALL
SUM OF ALL RESPONSES TO PHQ QUESTIONS 1-9: 9
SUM OF ALL RESPONSES TO PHQ9 QUESTIONS 1 & 2: 2
SUM OF ALL RESPONSES TO PHQ QUESTIONS 1-9: 9
6. FEELING BAD ABOUT YOURSELF - OR THAT YOU ARE A FAILURE OR HAVE LET YOURSELF OR YOUR FAMILY DOWN: NEARLY EVERY DAY
4. FEELING TIRED OR HAVING LITTLE ENERGY: SEVERAL DAYS
7. TROUBLE CONCENTRATING ON THINGS, SUCH AS READING THE NEWSPAPER OR WATCHING TELEVISION: NOT AT ALL
SUM OF ALL RESPONSES TO PHQ QUESTIONS 1-9: 9
3. TROUBLE FALLING OR STAYING ASLEEP: SEVERAL DAYS
2. FEELING DOWN, DEPRESSED OR HOPELESS: SEVERAL DAYS
10. IF YOU CHECKED OFF ANY PROBLEMS, HOW DIFFICULT HAVE THESE PROBLEMS MADE IT FOR YOU TO DO YOUR WORK, TAKE CARE OF THINGS AT HOME, OR GET ALONG WITH OTHER PEOPLE: NOT DIFFICULT AT ALL
5. POOR APPETITE OR OVEREATING: MORE THAN HALF THE DAYS
5. POOR APPETITE OR OVEREATING: MORE THAN HALF THE DAYS
9. THOUGHTS THAT YOU WOULD BE BETTER OFF DEAD, OR OF HURTING YOURSELF: NOT AT ALL
SUM OF ALL RESPONSES TO PHQ QUESTIONS 1-9: 9
7. TROUBLE CONCENTRATING ON THINGS, SUCH AS READING THE NEWSPAPER OR WATCHING TELEVISION: NOT AT ALL
3. TROUBLE FALLING OR STAYING ASLEEP: SEVERAL DAYS
1. LITTLE INTEREST OR PLEASURE IN DOING THINGS: SEVERAL DAYS
2. FEELING DOWN, DEPRESSED OR HOPELESS: SEVERAL DAYS
1. LITTLE INTEREST OR PLEASURE IN DOING THINGS: SEVERAL DAYS
8. MOVING OR SPEAKING SO SLOWLY THAT OTHER PEOPLE COULD HAVE NOTICED. OR THE OPPOSITE, BEING SO FIGETY OR RESTLESS THAT YOU HAVE BEEN MOVING AROUND A LOT MORE THAN USUAL: NOT AT ALL
4. FEELING TIRED OR HAVING LITTLE ENERGY: SEVERAL DAYS
SUM OF ALL RESPONSES TO PHQ QUESTIONS 1-9: 9
6. FEELING BAD ABOUT YOURSELF - OR THAT YOU ARE A FAILURE OR HAVE LET YOURSELF OR YOUR FAMILY DOWN: NEARLY EVERY DAY

## 2024-05-02 ENCOUNTER — OFFICE VISIT (OUTPATIENT)
Dept: FAMILY MEDICINE CLINIC | Age: 64
End: 2024-05-02
Payer: COMMERCIAL

## 2024-05-02 VITALS
HEIGHT: 64 IN | SYSTOLIC BLOOD PRESSURE: 128 MMHG | RESPIRATION RATE: 18 BRPM | TEMPERATURE: 97.4 F | DIASTOLIC BLOOD PRESSURE: 76 MMHG | HEART RATE: 81 BPM | BODY MASS INDEX: 30.32 KG/M2 | OXYGEN SATURATION: 95 % | WEIGHT: 177.6 LBS

## 2024-05-02 DIAGNOSIS — F43.10 PTSD (POST-TRAUMATIC STRESS DISORDER): ICD-10-CM

## 2024-05-02 DIAGNOSIS — R74.02 ELEVATION OF LEVEL OF TRANSAMINASE AND LACTIC ACID DEHYDROGENASE (LDH): ICD-10-CM

## 2024-05-02 DIAGNOSIS — R74.01 ELEVATION OF LEVEL OF TRANSAMINASE AND LACTIC ACID DEHYDROGENASE (LDH): ICD-10-CM

## 2024-05-02 DIAGNOSIS — E78.49 OTHER HYPERLIPIDEMIA: ICD-10-CM

## 2024-05-02 DIAGNOSIS — R73.9 HYPERGLYCEMIA: ICD-10-CM

## 2024-05-02 DIAGNOSIS — Z80.0 FAMILY HISTORY OF MALIGNANT NEOPLASM OF PANCREAS: ICD-10-CM

## 2024-05-02 DIAGNOSIS — D05.12 DUCTAL CARCINOMA IN SITU (DCIS) OF LEFT BREAST: ICD-10-CM

## 2024-05-02 DIAGNOSIS — D05.12 DUCTAL CARCINOMA IN SITU (DCIS) OF LEFT BREAST: Primary | ICD-10-CM

## 2024-05-02 LAB
ALBUMIN: 4.7 G/DL (ref 3.5–5.2)
ALP BLD-CCNC: 103 U/L (ref 35–104)
ALT SERPL-CCNC: 14 U/L (ref 0–32)
ANION GAP SERPL CALCULATED.3IONS-SCNC: 17 MMOL/L (ref 7–16)
AST SERPL-CCNC: 14 U/L (ref 0–31)
BASOPHILS ABSOLUTE: 0.04 K/UL (ref 0–0.2)
BASOPHILS RELATIVE PERCENT: 1 % (ref 0–2)
BILIRUB SERPL-MCNC: <0.2 MG/DL (ref 0–1.2)
BUN BLDV-MCNC: 19 MG/DL (ref 6–23)
CALCIUM SERPL-MCNC: 10 MG/DL (ref 8.6–10.2)
CHLORIDE BLD-SCNC: 103 MMOL/L (ref 98–107)
CHOLESTEROL, TOTAL: 337 MG/DL
CO2: 22 MMOL/L (ref 22–29)
CREAT SERPL-MCNC: 0.7 MG/DL (ref 0.5–1)
EOSINOPHILS ABSOLUTE: 0.05 K/UL (ref 0.05–0.5)
EOSINOPHILS RELATIVE PERCENT: 1 % (ref 0–6)
GFR, ESTIMATED: >90 ML/MIN/1.73M2
GLUCOSE BLD-MCNC: 202 MG/DL (ref 74–99)
HCT VFR BLD CALC: 42.6 % (ref 34–48)
HDLC SERPL-MCNC: 47 MG/DL
HEMOGLOBIN: 13.3 G/DL (ref 11.5–15.5)
HIGH SENSITIVE C-REACTIVE PROTEIN: 5.7 MG/L (ref 0–3)
IMMATURE GRANULOCYTES %: 1 % (ref 0–5)
IMMATURE GRANULOCYTES ABSOLUTE: 0.05 K/UL (ref 0–0.58)
LDL CHOLESTEROL: 235 MG/DL
LYMPHOCYTES ABSOLUTE: 1.75 K/UL (ref 1.5–4)
LYMPHOCYTES RELATIVE PERCENT: 22 % (ref 20–42)
MCH RBC QN AUTO: 29.7 PG (ref 26–35)
MCHC RBC AUTO-ENTMCNC: 31.2 G/DL (ref 32–34.5)
MCV RBC AUTO: 95.1 FL (ref 80–99.9)
MONOCYTES ABSOLUTE: 0.56 K/UL (ref 0.1–0.95)
MONOCYTES RELATIVE PERCENT: 7 % (ref 2–12)
NEUTROPHILS ABSOLUTE: 5.62 K/UL (ref 1.8–7.3)
NEUTROPHILS RELATIVE PERCENT: 70 % (ref 43–80)
PDW BLD-RTO: 12.9 % (ref 11.5–15)
PLATELET # BLD: 376 K/UL (ref 130–450)
PMV BLD AUTO: 9.5 FL (ref 7–12)
POTASSIUM SERPL-SCNC: 4.5 MMOL/L (ref 3.5–5)
RBC # BLD: 4.48 M/UL (ref 3.5–5.5)
SODIUM BLD-SCNC: 142 MMOL/L (ref 132–146)
TOTAL PROTEIN: 7.5 G/DL (ref 6.4–8.3)
TRIGL SERPL-MCNC: 276 MG/DL
TSH SERPL DL<=0.05 MIU/L-ACNC: 0.83 UIU/ML (ref 0.27–4.2)
VLDLC SERPL CALC-MCNC: 55 MG/DL
WBC # BLD: 8.1 K/UL (ref 4.5–11.5)

## 2024-05-02 PROCEDURE — 99214 OFFICE O/P EST MOD 30 MIN: CPT | Performed by: FAMILY MEDICINE

## 2024-05-02 RX ORDER — FLUOXETINE HYDROCHLORIDE 20 MG/1
20 CAPSULE ORAL DAILY
Qty: 90 CAPSULE | Refills: 3 | Status: SHIPPED | OUTPATIENT
Start: 2024-05-02

## 2024-05-02 RX ORDER — MAGNESIUM ASCORBATE
POWDER (GRAM) MISCELLANEOUS
COMMUNITY

## 2024-05-02 SDOH — ECONOMIC STABILITY: FOOD INSECURITY: WITHIN THE PAST 12 MONTHS, THE FOOD YOU BOUGHT JUST DIDN'T LAST AND YOU DIDN'T HAVE MONEY TO GET MORE.: NEVER TRUE

## 2024-05-02 SDOH — ECONOMIC STABILITY: INCOME INSECURITY: HOW HARD IS IT FOR YOU TO PAY FOR THE VERY BASICS LIKE FOOD, HOUSING, MEDICAL CARE, AND HEATING?: NOT HARD AT ALL

## 2024-05-02 SDOH — ECONOMIC STABILITY: FOOD INSECURITY: WITHIN THE PAST 12 MONTHS, YOU WORRIED THAT YOUR FOOD WOULD RUN OUT BEFORE YOU GOT MONEY TO BUY MORE.: NEVER TRUE

## 2024-05-02 ASSESSMENT — ENCOUNTER SYMPTOMS
RHINORRHEA: 1
SHORTNESS OF BREATH: 0
PHOTOPHOBIA: 0
EYE REDNESS: 0
COUGH: 0
VOMITING: 0
DIARRHEA: 0
EYES NEGATIVE: 1
CHEST TIGHTNESS: 0
BLOOD IN STOOL: 0
CONSTIPATION: 0
ABDOMINAL PAIN: 0
WHEEZING: 0

## 2024-05-02 NOTE — PROGRESS NOTES
2024), Disp: , Rfl:   Allergies   Allergen Reactions    Fish-Derived Products Swelling       Past Medical History:   Diagnosis Date    Anxiety     Breast cancer (HCC)     dcis left    Depression     HLD (hyperlipidemia)      Past Surgical History:   Procedure Laterality Date    BREAST LUMPECTOMY Left 2023    Left breast mag seed localized lumpectomy performed by Sadi Perry MD at [facility] [place]     SECTION      JUANITA STEROTACTIC LOC BREAST BIOPSY LEFT Left 2023    JUANITA STEROTACTIC LOC BREAST BIOPSY LEFT 2023 [facility]    TONSILLECTOMY      TUBAL LIGATION      during c/s      Family History   Problem Relation Age of Onset    Cancer Mother 74        pancreas tumor    Suicide Father     Suicide Sister     Cancer Maternal Aunt 64        pancreatic cancer     Social History       Tobacco History       Smoking Status  Former Smoking Start Date   Quit Date   Average Packs/Day  0.5 packs/day for 10.0 years (5.0 ttl pk-yrs) Smoking Tobacco Type  Cigarettes from  to    Pack Year History     Packs/Day From To Years    0   36.3    0.5 1978 10.0      Passive Exposure  Never      Smokeless Tobacco Use  Never              Alcohol History       Alcohol Use Status  Yes Comment  occassionally              Drug Use       Drug Use Status  Never              Sexual Activity       Sexually Active  Yes Partners  Male                    OBJECTIVE  Vitals:    24 1450   BP: 128/76   Pulse: 81   Resp: 18   Temp: 97.4 °F (36.3 °C)   TempSrc: Temporal   SpO2: 95%   Weight: 80.6 kg (177 lb 9.6 oz)   Height: 1.626 m (5' 4\")        Body mass index is 30.48 kg/m².    Orders Placed This Encounter   Procedures    US GALLBLADDER RUQ     This procedure can be scheduled via MyChart.      Standing Status:   Future     Standing Expiration Date:   2025    CBC with Auto Differential     Standing Status:   Future     Number of Occurrences:   1     Standing Expiration Date:   2025

## 2024-05-03 LAB — HBA1C MFR BLD: 7.5 % (ref 4–5.6)

## 2024-05-06 DIAGNOSIS — E78.49 OTHER HYPERLIPIDEMIA: ICD-10-CM

## 2024-05-06 DIAGNOSIS — E11.9 CONTROLLED TYPE 2 DIABETES MELLITUS WITHOUT COMPLICATION, WITHOUT LONG-TERM CURRENT USE OF INSULIN (HCC): Primary | ICD-10-CM

## 2024-05-06 RX ORDER — ROSUVASTATIN CALCIUM 10 MG/1
10 TABLET, COATED ORAL DAILY
Qty: 90 TABLET | Refills: 1 | Status: SHIPPED | OUTPATIENT
Start: 2024-05-06

## 2024-06-07 ENCOUNTER — OFFICE VISIT (OUTPATIENT)
Dept: FAMILY MEDICINE CLINIC | Age: 64
End: 2024-06-07
Payer: COMMERCIAL

## 2024-06-07 VITALS
TEMPERATURE: 97.3 F | HEIGHT: 64 IN | DIASTOLIC BLOOD PRESSURE: 86 MMHG | OXYGEN SATURATION: 97 % | BODY MASS INDEX: 29.71 KG/M2 | HEART RATE: 68 BPM | WEIGHT: 174 LBS | SYSTOLIC BLOOD PRESSURE: 128 MMHG

## 2024-06-07 DIAGNOSIS — R73.9 HYPERGLYCEMIA: ICD-10-CM

## 2024-06-07 DIAGNOSIS — L23.7 POISON IVY DERMATITIS: Primary | ICD-10-CM

## 2024-06-07 PROCEDURE — 99213 OFFICE O/P EST LOW 20 MIN: CPT | Performed by: INTERNAL MEDICINE

## 2024-06-07 RX ORDER — TRIAMCINOLONE ACETONIDE 40 MG/ML
40 INJECTION, SUSPENSION INTRA-ARTICULAR; INTRAMUSCULAR ONCE
Status: COMPLETED | OUTPATIENT
Start: 2024-06-07 | End: 2024-06-07

## 2024-06-07 RX ORDER — TRIAMCINOLONE ACETONIDE 1 MG/G
CREAM TOPICAL
Qty: 30 G | Refills: 0 | Status: SHIPPED | OUTPATIENT
Start: 2024-06-07

## 2024-06-07 RX ADMIN — TRIAMCINOLONE ACETONIDE 40 MG: 40 INJECTION, SUSPENSION INTRA-ARTICULAR; INTRAMUSCULAR at 08:32

## 2024-06-07 ASSESSMENT — ENCOUNTER SYMPTOMS
TROUBLE SWALLOWING: 0
FACIAL SWELLING: 0
SHORTNESS OF BREATH: 0

## 2024-06-07 NOTE — PROGRESS NOTES
[facility]     24  Maryam Mjoica : 1960 Sex: female  Age: 63 y.o.    Chief Complaint   Patient presents with    Poison Ivy     X2 weeks with poison ivy  Week wacking outside       HPI  Patient presents to express care today complaining of getting into poison ivy about 2 weeks ago and has kind of lingered.  She has tried over-the-counter Ivy dry without much success.  States it is limited to her legs.  She was out weed whacking at the time and feels she got it from that.  Denies any other systemic symptoms.  She was recently diagnosed as type II diabetic and is currently on metformin.  Last A1c was 7.5.  Denies any facial swelling or shortness of breath with any of this.      Review of Systems   Constitutional:  Negative for chills and fever.   HENT:  Negative for facial swelling and trouble swallowing.    Respiratory:  Negative for shortness of breath.    Cardiovascular:  Negative for chest pain.   Skin:  Positive for rash.               REST OF PERTINENT ROS GONE OVER AND WAS NEGATIVE.               Current Outpatient Medications:     triamcinolone (KENALOG) 0.1 % cream, Apply topically 2 times daily., Disp: 30 g, Rfl: 0    rosuvastatin (CRESTOR) 10 MG tablet, Take 1 tablet by mouth daily, Disp: 90 tablet, Rfl: 1    metFORMIN (GLUCOPHAGE) 500 MG tablet, Take 1 tablet by mouth 2 times daily (with meals), Disp: 180 tablet, Rfl: 1    Magnesium Ascorbate POWD, by Does not apply route, Disp: , Rfl:     FLUoxetine (PROZAC) 20 MG capsule, Take 1 capsule by mouth daily, Disp: 90 capsule, Rfl: 3    Multiple Vitamins-Minerals (CENTRUM SILVER 50+WOMEN PO), Take by mouth, Disp: , Rfl:   Allergies   Allergen Reactions    Fish-Derived Products Swelling       Past Medical History:   Diagnosis Date    Anxiety     Breast cancer (HCC)     dcis left    Depression     HLD (hyperlipidemia)      Past Surgical History:   Procedure Laterality Date    BREAST LUMPECTOMY Left 2023    Left breast mag seed

## 2024-08-02 ENCOUNTER — OFFICE VISIT (OUTPATIENT)
Dept: FAMILY MEDICINE CLINIC | Age: 64
End: 2024-08-02

## 2024-08-02 VITALS
HEIGHT: 64 IN | HEART RATE: 98 BPM | DIASTOLIC BLOOD PRESSURE: 80 MMHG | WEIGHT: 172 LBS | TEMPERATURE: 97.6 F | BODY MASS INDEX: 29.37 KG/M2 | OXYGEN SATURATION: 96 % | SYSTOLIC BLOOD PRESSURE: 110 MMHG

## 2024-08-02 DIAGNOSIS — M25.561 RIGHT KNEE PAIN, UNSPECIFIED CHRONICITY: Primary | ICD-10-CM

## 2024-08-02 DIAGNOSIS — M79.661 RIGHT CALF PAIN: ICD-10-CM

## 2024-08-02 RX ORDER — METHYLPREDNISOLONE ACETATE 40 MG/ML
40 INJECTION, SUSPENSION INTRA-ARTICULAR; INTRALESIONAL; INTRAMUSCULAR; SOFT TISSUE ONCE
Status: COMPLETED | OUTPATIENT
Start: 2024-08-02 | End: 2024-08-02

## 2024-08-02 RX ORDER — METHYLPREDNISOLONE 4 MG/1
TABLET ORAL
Qty: 1 KIT | Refills: 0 | Status: SHIPPED | OUTPATIENT
Start: 2024-08-02

## 2024-08-02 RX ADMIN — METHYLPREDNISOLONE ACETATE 40 MG: 40 INJECTION, SUSPENSION INTRA-ARTICULAR; INTRALESIONAL; INTRAMUSCULAR; SOFT TISSUE at 09:33

## 2024-08-02 NOTE — PROGRESS NOTES
Chief Complaint   Leg Pain (Patient reports pain to the back side of her right leg. Worse when moving the leg. Started a couple months ago, but worse over the past few days. )    History of Present Illness   Source of history provided by:  patient.          Maryam Mojica is a 63 y.o. old female presenting to the walk in clinic for evaluation of right posterior knee and upper calf pain for the past couple of months.  Symptoms have worsened over the past few days.  Denies any known injury.  Denies any previous history of knee problems.  Patient does have history of malignancy and tobacco use.  Denies history of previous DVT/PE, recent travel/surgery/hospitalization. Denies any CP, SOB, GRIMES, orthopnea, paresthesias, weakness, fever, chills, or abrasions. Denies any hx of previous injuries or surgeries at the site.  Patient has not attempted any over-the-counter treatments.    ROS    Unless otherwise stated in this report or unable to obtain because of the patient's clinical or mental status as evidenced by the medical record, this patients's positive and negative responses for Review of Systems, constitutional, psych, eyes, ENT, cardiovascular, respiratory, gastrointestinal, neurological, genitourinary, musculoskeletal, integument systems and systems related to the presenting problem are either stated in the preceding or were not pertinent or were negative for the symptoms and/or complaints related to the medical problem.    Physical Exam         VS:  /80 (Site: Right Upper Arm)   Pulse 98   Temp 97.6 °F (36.4 °C) (Temporal)   Ht 1.626 m (5' 4\")   Wt 78 kg (172 lb)   SpO2 96%   BMI 29.52 kg/m²    Oxygen Saturation Interpretation: Normal.    Constitutional:  Alert, development consistent with age.  Neck:  Normal ROM.  Supple.  Chest: Heart RRR without pathologic murmurs or gallops.  Lungs CTAB without W/R/R.   Knee:  Inspection: Right knee without obvious deformity.              Tenderness:  Mild TTP over

## 2024-08-23 ENCOUNTER — OFFICE VISIT (OUTPATIENT)
Dept: ORTHOPEDIC SURGERY | Age: 64
End: 2024-08-23
Payer: COMMERCIAL

## 2024-08-23 VITALS — WEIGHT: 174 LBS | HEIGHT: 64 IN | BODY MASS INDEX: 29.71 KG/M2

## 2024-08-23 DIAGNOSIS — M17.11 PRIMARY OSTEOARTHRITIS OF RIGHT KNEE: Primary | ICD-10-CM

## 2024-08-23 PROCEDURE — 20610 DRAIN/INJ JOINT/BURSA W/O US: CPT | Performed by: PHYSICIAN ASSISTANT

## 2024-08-23 PROCEDURE — 99204 OFFICE O/P NEW MOD 45 MIN: CPT | Performed by: PHYSICIAN ASSISTANT

## 2024-08-23 RX ORDER — LIDOCAINE HYDROCHLORIDE 10 MG/ML
4 INJECTION, SOLUTION INFILTRATION; PERINEURAL ONCE
Status: COMPLETED | OUTPATIENT
Start: 2024-08-23 | End: 2024-08-23

## 2024-08-23 RX ORDER — TRIAMCINOLONE ACETONIDE 40 MG/ML
40 INJECTION, SUSPENSION INTRA-ARTICULAR; INTRAMUSCULAR ONCE
Status: COMPLETED | OUTPATIENT
Start: 2024-08-23 | End: 2024-08-23

## 2024-08-23 RX ADMIN — LIDOCAINE HYDROCHLORIDE 4 ML: 10 INJECTION, SOLUTION INFILTRATION; PERINEURAL at 08:44

## 2024-08-23 RX ADMIN — TRIAMCINOLONE ACETONIDE 40 MG: 40 INJECTION, SUSPENSION INTRA-ARTICULAR; INTRAMUSCULAR at 08:45

## 2024-08-23 NOTE — PROGRESS NOTES
[facility]  New Patient Note      CHIEF COMPLAINT:   Chief Complaint   Patient presents with    Knee Pain     Pt presents this AM with c/o pain in her R knee. Was seen at  walk-in for this issue at the beginning of the month. Pain is is posterior knee. Is interested in an injection today. Has noticed pain for a few months. No known HARI. Has been taking Tylenol and Ibuprofen to manage her symptoms.       HISTORY OF PRESENT ILLNESS:                The patient is a 63 y.o. female who presents today with complaints of right knee pain for the last few months with no known mechanism of injury.  She was initially seen at primary care walk-in and had imaging which was negative for acute findings of fracture or dislocation.  There was some degenerative changes noted on the imaging.  Pt localizes the pain to posterior aspect of the knee.  Pt denies any numbness, tingling, loss of sensation or radiation of symptoms into toes.  Pain is worse with knee flexion, trying to get down or up off the floor, prolonged walking.  They have tried at home therapies of ibuprofen, Tylenol, Medrol Dosepak for symptomatic relief.  Pt has never injured this knee in the past.         Past Medical History:        Diagnosis Date    Anxiety     Breast cancer (HCC)     dcis left    Depression     HLD (hyperlipidemia)      Past Surgical History:        Procedure Laterality Date    BREAST LUMPECTOMY Left 2023    Left breast mag seed localized lumpectomy performed by Sadi Perry MD at [facility] [place]     SECTION      JUANITA STEROTACTIC LOC BREAST BIOPSY LEFT Left 2023    JUANITA STEROTACTIC LOC BREAST BIOPSY LEFT 2023 [facility] [place] [facility]    TONSILLECTOMY      TUBAL LIGATION      during c/s      Current Medications:   No current facility-administered medications for this visit.  Allergies:  Fish-derived products    Social History:   TOBACCO:   reports that she quit smoking about 36 years ago. Her smoking

## 2024-08-23 NOTE — PATIENT INSTRUCTIONS
*Patient can use VOLTAREN GEL 1% (DICLOFENAC SODIUM) Apply 4 grams twice daily to the affected knee as needed, which can be obtained over the counter.

## 2024-10-02 ENCOUNTER — TELEPHONE (OUTPATIENT)
Dept: FAMILY MEDICINE CLINIC | Age: 64
End: 2024-10-02

## 2024-10-02 NOTE — TELEPHONE ENCOUNTER
I called and spoke with patient and she wanted the results from both the US and the Xray.   She wanted the disc from the Xray as well.     I asked the Xray girls if they can do the disc. They stated that the can do so. Name, date of birth and Xray date was given to Xray to do the disc.     I called back the patient to informed her that she can come and get this tomorrow and she will have to sign a release of HIPAA  form as well.   Patient gave a verbal understanding.       I printed the results to both the Xray from 08/02/2024 and US as well.     I left them upfront for patient to .

## 2024-10-10 NOTE — PROGRESS NOTES
Medical breast clinic note/[facility]  [phone]    Patient ID:  Maryam Mojica is a 64 y.o. female.     DEISY Francisco is a zhen 64 y.o. female who presents for follow-up of her left breast DCIS.  Risks for breast cancer include age, gender, mother with pancreatic cancer age 74, and maternal aunt with pancreatic cancer.  Menarche age 14.  Menopause age 52.  .  First live birth age 30.    IMAGING/PROCEDURES:  1. (23) BILATERAL dt-mammogram: BIRADS-4  * For pain  * LEFT UOQ calcifications  2. (23) LEFT stereotactic biopsy       2023 she underwent left breast, 1 o'clock position biopsy.  Pathologic evaluation at [facility]:   Ductal carcinoma in situ, intermediate to high nuclear grade with necrosis    Breast Cancer Marker Studies:  Estrogen Receptors (ER): Positive, >90%.  Intensity: Moderate to Strong  Progesterone Receptors (DE): Positive 70%      2023 she underwent a left breast Magseed localized lumpectomy per Dr. Perry.  Pathologic evaluation at [facility]:  Left breast, excision: Ductal carcinoma in situ, see comment.     -- Diagnosis Comment --   CANCER CASE SUMMARYProcedure-excisionSpecimen Laterality-left  Tumor Site-1  Type-ductal carcinoma in situSize (Extent) of DCIS-6 mm  Nuclear Grade-3.  Necrosis-present  Margin Status-all margins negative for DCIS.  Distance from DCIS to closest margin-5 mm/superior  Regional Lymph Node Status-not applicablePathologic Stage   Classification (AJCC 8th Ed.)- pTis (DCIS)Additional   Note-intradepartmental consultation is obtained.Special studies-  ER positive, DE positive ([id number])     Prelude DX decision RT score: 1.5  10 Year Total Risk With Breast Conserving Surgery Alone: 6%   10 Year Total Risk With Breast Conserving Surgery and Radiation: 5%   10 Year Invasive Risk with Breast Conserving Surgery Alone: 4%   10 Year Invasive Risk With Breast Conserving Surgery and Radiation: 3%     RT not recommended per Dr. Yoo  Referred to

## 2024-10-11 ENCOUNTER — TELEPHONE (OUTPATIENT)
Dept: BREAST CENTER | Age: 64
End: 2024-10-11

## 2024-10-11 NOTE — TELEPHONE ENCOUNTER
RN Madie Barragan received a call from patient wanting to reschedule her upcoming appointment with NP on 10/14/2024. RN rescheduled pt for 1/6/2025 @ 10am with NP, mammogram @ 9:30am  Patient needed an appointment after 12/16/2024 D/T insurance reasons and wanted a morning appointment. '      Electronically signed by Madie Barragan RN on 10/11/24 at 3:19 PM EDT

## 2024-11-16 ENCOUNTER — TELEPHONE (OUTPATIENT)
Dept: FAMILY MEDICINE CLINIC | Age: 64
End: 2024-11-16

## 2024-11-25 DIAGNOSIS — E78.49 OTHER HYPERLIPIDEMIA: ICD-10-CM

## 2024-11-25 DIAGNOSIS — E11.9 CONTROLLED TYPE 2 DIABETES MELLITUS WITHOUT COMPLICATION, WITHOUT LONG-TERM CURRENT USE OF INSULIN (HCC): ICD-10-CM

## 2024-11-25 DIAGNOSIS — F43.10 PTSD (POST-TRAUMATIC STRESS DISORDER): ICD-10-CM

## 2024-11-25 RX ORDER — ROSUVASTATIN CALCIUM 10 MG/1
10 TABLET, COATED ORAL DAILY
Qty: 90 TABLET | Refills: 1 | Status: SHIPPED | OUTPATIENT
Start: 2024-11-25

## 2024-11-25 RX ORDER — ROSUVASTATIN CALCIUM 10 MG/1
10 TABLET, COATED ORAL DAILY
Qty: 90 TABLET | Refills: 3 | OUTPATIENT
Start: 2024-11-25

## 2024-11-25 NOTE — TELEPHONE ENCOUNTER
Appointment scheduled.  Last Appointment:  5/2/2024  Future Appointments   Date Time Provider Department Center   12/16/2024  3:45 PM Napoleon Carlos MD COL[facility] DEP   1/6/2025  9:30 AM KARI ESCOBEDO IMAGING [facility]   1/6/2025 10:00 AM Chandrika Gutierrez, APRN - CNP [facility]

## 2024-11-25 NOTE — TELEPHONE ENCOUNTER
Name of Medication(s) Requested:  Requested Prescriptions     Pending Prescriptions Disp Refills    rosuvastatin (CRESTOR) 10 MG tablet 90 tablet 3     Sig: Take 1 tablet by mouth daily    metFORMIN (GLUCOPHAGE) 500 MG tablet 180 tablet 3     Sig: Take 1 tablet by mouth 2 times daily (with meals)    FLUoxetine (PROZAC) 20 MG capsule 90 capsule 3     Sig: Take 1 capsule by mouth daily       Medication is on current medication list Yes    Dosage and directions were verified? Yes    Quantity verified: 90 day supply     Pharmacy Verified?  Yes    Last Appointment:  5/2/2024    Future appts:  Future Appointments   Date Time Provider Department Center   1/6/2025  9:30 AM KARI ESCOBEDO IMAGING [facility]   1/6/2025 10:00 AM Chandrika Gutierrez APRN - [facility]        (If no appt send self scheduling link. .REFILLAPPT)  Scheduling request sent?     [] Yes  [x] No    Does patient need updated?  [] Yes  [x] No

## 2024-12-16 ENCOUNTER — OFFICE VISIT (OUTPATIENT)
Dept: FAMILY MEDICINE CLINIC | Age: 64
End: 2024-12-16
Payer: COMMERCIAL

## 2024-12-16 VITALS
TEMPERATURE: 97.1 F | HEART RATE: 85 BPM | DIASTOLIC BLOOD PRESSURE: 74 MMHG | WEIGHT: 174 LBS | OXYGEN SATURATION: 93 % | RESPIRATION RATE: 17 BRPM | HEIGHT: 64 IN | SYSTOLIC BLOOD PRESSURE: 128 MMHG | BODY MASS INDEX: 29.71 KG/M2

## 2024-12-16 DIAGNOSIS — F32.9 REACTIVE DEPRESSION: Primary | ICD-10-CM

## 2024-12-16 DIAGNOSIS — D05.12 INTRADUCTAL CARCINOMA IN SITU OF LEFT BREAST: ICD-10-CM

## 2024-12-16 DIAGNOSIS — E11.9 CONTROLLED TYPE 2 DIABETES MELLITUS WITHOUT COMPLICATION, WITHOUT LONG-TERM CURRENT USE OF INSULIN (HCC): ICD-10-CM

## 2024-12-16 DIAGNOSIS — E78.49 OTHER HYPERLIPIDEMIA: ICD-10-CM

## 2024-12-16 LAB — HBA1C MFR BLD: 6.6 %

## 2024-12-16 PROCEDURE — 3044F HG A1C LEVEL LT 7.0%: CPT | Performed by: FAMILY MEDICINE

## 2024-12-16 PROCEDURE — 99214 OFFICE O/P EST MOD 30 MIN: CPT | Performed by: FAMILY MEDICINE

## 2024-12-16 PROCEDURE — 83036 HEMOGLOBIN GLYCOSYLATED A1C: CPT | Performed by: FAMILY MEDICINE

## 2024-12-16 ASSESSMENT — ENCOUNTER SYMPTOMS
EYE REDNESS: 0
VOMITING: 0
EYES NEGATIVE: 1
WHEEZING: 0
BLOOD IN STOOL: 0
PHOTOPHOBIA: 0
CHEST TIGHTNESS: 0
SHORTNESS OF BREATH: 0
SINUS PRESSURE: 1
ABDOMINAL PAIN: 0
DIARRHEA: 0
COLOR CHANGE: 0
COUGH: 0
CONSTIPATION: 0

## 2024-12-16 NOTE — PROGRESS NOTES
OFFICE NOTE    24  Name: Maryam Mojica  :1960   Sex:female   Age:64 y.o.      SUBJECTIVE  Chief Complaint   Patient presents with    Annual Exam       HPI comes in for f/u. After BW 6 mos ago Statin was resumed and was started on Metformin. She is tolerating both    Review of Systems   Constitutional:  Positive for activity change. Negative for appetite change, fatigue, fever and unexpected weight change.   HENT:  Positive for congestion and sinus pressure. Negative for ear pain and postnasal drip.    Eyes: Negative.  Negative for photophobia, redness and visual disturbance.   Respiratory:  Negative for cough, chest tightness, shortness of breath and wheezing.    Cardiovascular:  Negative for chest pain, palpitations and leg swelling.   Gastrointestinal:  Negative for abdominal pain, blood in stool, constipation, diarrhea and vomiting.   Endocrine: Negative for cold intolerance, polydipsia and polyuria.   Genitourinary:  Positive for frequency and urgency. Negative for dysuria and hematuria.   Musculoskeletal:  Positive for arthralgias. Negative for gait problem and joint swelling.   Skin:  Negative for color change, pallor, rash and wound.   Allergic/Immunologic: Negative for environmental allergies and food allergies.   Neurological:  Negative for dizziness, tremors, seizures, syncope, weakness, numbness and headaches.   Hematological:  Negative for adenopathy. Does not bruise/bleed easily.   Psychiatric/Behavioral:  Negative for behavioral problems, confusion, dysphoric mood and sleep disturbance. The patient is nervous/anxious.             Current Outpatient Medications:     FLUoxetine (PROZAC) 20 MG capsule, Take 1 capsule by mouth daily, Disp: 90 capsule, Rfl: 1    rosuvastatin (CRESTOR) 10 MG tablet, Take 1 tablet by mouth daily, Disp: 90 tablet, Rfl: 1    metFORMIN (GLUCOPHAGE) 500 MG tablet, Take 1 tablet by mouth 2 times daily (with meals), Disp: 180 tablet, Rfl: 1    methylPREDNISolone (MEDROL

## 2024-12-31 ENCOUNTER — OFFICE VISIT (OUTPATIENT)
Dept: FAMILY MEDICINE CLINIC | Age: 64
End: 2024-12-31

## 2024-12-31 VITALS
SYSTOLIC BLOOD PRESSURE: 110 MMHG | TEMPERATURE: 97.2 F | WEIGHT: 175 LBS | OXYGEN SATURATION: 96 % | DIASTOLIC BLOOD PRESSURE: 80 MMHG | HEIGHT: 64 IN | HEART RATE: 95 BPM | BODY MASS INDEX: 29.88 KG/M2

## 2024-12-31 DIAGNOSIS — J01.40 ACUTE NON-RECURRENT PANSINUSITIS: Primary | ICD-10-CM

## 2024-12-31 RX ORDER — CEFDINIR 300 MG/1
300 CAPSULE ORAL 2 TIMES DAILY
Qty: 20 CAPSULE | Refills: 0 | Status: SHIPPED | OUTPATIENT
Start: 2024-12-31 | End: 2025-01-10

## 2024-12-31 NOTE — PROGRESS NOTES
Chief Complaint       Congestion (X3 days, sinus headache, no fever)      History of Present Illness   Source of history provided by:  patient.    Maryam Mojica is a 64 y.o. old female presenting to the walk in clinic for evaluation of sinus pressure, discolored nasal drainage, bilateral ear pressure, and sinus headaches for the past 3 to 4 days. Denies any fever, chills, CP, dyspnea, LE edema, abdominal pain, vomiting, rash, or lethargy. Denies any hx of asthma or COPD. Patient denies recent sick exposures.     ROS    Unless otherwise stated in this report or unable to obtain because of the patient's clinical or mental status as evidenced by the medical record, this patients's positive and negative responses for Review of Systems, constitutional, psych, eyes, ENT, cardiovascular, respiratory, gastrointestinal, neurological, genitourinary, musculoskeletal, integument systems and systems related to the presenting problem are either stated in the preceding or were not pertinent or were negative for the symptoms and/or complaints related to the medical problem.    Past Medical History:  has a past medical history of Anxiety, Breast cancer (HCC), Depression, and HLD (hyperlipidemia).  Past Surgical History:  has a past surgical history that includes Tonsillectomy; Tubal ligation;  section (); JUANITA STEREO BREAST BX W LOC DEVICE 1ST LESION LEFT (Left, 2023); and Breast lumpectomy (Left, 2023).  Social History:  reports that she quit smoking about 37 years ago. Her smoking use included cigarettes. She started smoking about 47 years ago. She has a 5 pack-year smoking history. She has never been exposed to tobacco smoke. She has never used smokeless tobacco. She reports current alcohol use. She reports that she does not use drugs.  Family History: family history includes Cancer (age of onset: 64) in her maternal aunt; Cancer (age of onset: 74) in her mother; Suicide in her father and sister.

## 2025-01-02 DIAGNOSIS — Z12.31 VISIT FOR SCREENING MAMMOGRAM: Primary | ICD-10-CM

## 2025-01-06 ENCOUNTER — OFFICE VISIT (OUTPATIENT)
Dept: BREAST CENTER | Age: 65
End: 2025-01-06
Payer: COMMERCIAL

## 2025-01-06 VITALS
WEIGHT: 172 LBS | SYSTOLIC BLOOD PRESSURE: 118 MMHG | HEART RATE: 88 BPM | TEMPERATURE: 97.9 F | DIASTOLIC BLOOD PRESSURE: 68 MMHG | HEIGHT: 65 IN | OXYGEN SATURATION: 97 % | BODY MASS INDEX: 28.66 KG/M2 | RESPIRATION RATE: 16 BRPM

## 2025-01-06 DIAGNOSIS — R92.8 OTHER ABNORMAL AND INCONCLUSIVE FINDINGS ON DIAGNOSTIC IMAGING OF BREAST: ICD-10-CM

## 2025-01-06 DIAGNOSIS — Z78.0 POSTMENOPAUSAL: ICD-10-CM

## 2025-01-06 DIAGNOSIS — Z79.811 USE OF ANASTROZOLE: Primary | ICD-10-CM

## 2025-01-06 DIAGNOSIS — Z80.0 FAMILY HISTORY OF MALIGNANT NEOPLASM OF DIGESTIVE ORGAN: ICD-10-CM

## 2025-01-06 DIAGNOSIS — D05.12 DUCTAL CARCINOMA IN SITU (DCIS) OF LEFT BREAST: ICD-10-CM

## 2025-01-06 DIAGNOSIS — Z12.11 ENCOUNTER FOR SCREENING COLONOSCOPY: ICD-10-CM

## 2025-01-06 DIAGNOSIS — Z85.3 PERSONAL HISTORY OF MALIGNANT NEOPLASM OF BREAST: ICD-10-CM

## 2025-01-06 PROCEDURE — 36415 COLL VENOUS BLD VENIPUNCTURE: CPT | Performed by: NURSE PRACTITIONER

## 2025-01-06 PROCEDURE — 99214 OFFICE O/P EST MOD 30 MIN: CPT | Performed by: NURSE PRACTITIONER

## 2025-01-06 RX ORDER — ANASTROZOLE 1 MG/1
1 TABLET ORAL DAILY
Qty: 30 TABLET | Refills: 3 | Status: SHIPPED | OUTPATIENT
Start: 2025-01-06

## 2025-01-06 ASSESSMENT — ENCOUNTER SYMPTOMS
RECTAL PAIN: 0
BLOOD IN STOOL: 0
APNEA: 0
STRIDOR: 0

## 2025-01-06 NOTE — PATIENT INSTRUCTIONS
[facility] schedulers will call you to schedule your left diagnostic mammogram. If you'd like to call them in the meantime their phone number is [phone], option 1.     Genetic testing drawn today. Educational folder provided to you.    Bone Density Scan due.    Referral to Dr. Adele Smalls for a screening colonoscopy.   Phone: [phone]      Typical follow-up schedule  Your follow-up schedule can depend on many factors, including the type of breast cancer, how advanced it was when it was found (the stage of the cancer), and how it was (or is being) treated.    Doctor visits: If you have finished treatment, your follow-up visits will probably be every few months at first. The longer you have been free of cancer, the less often the appointments are needed.  Part of your visit will include a complete breast exam in addition to any imaging that has been ordered.  After 5 years, follow-ups are typically done about once a year.    Breast Imaging:  Mammograms: If you had breast-conserving surgery (lumpectomy or partial mastectomy), you will probably have a mammogram about 6 to 12 months after surgery and radiation are completed, and then at least every year after that.    Screening Ultrasounds:  According to the American College of Radiology (ACR), consideration should be made for bilateral complete breast screening ultrasounds in women who have been noted to have dense breast tissue on imaging.  Based on breast tissue density scores of Grade C or Grade D breast tissue density; you are encouraged to consider complete breast screening ultrasounds of remaining breast annually.  You should contact your insurance provider prior to having breast ultrasounds to ensure out-of-pocket costs are acceptable.  It should be noted that there are conflicting reports on the value of ultrasound screening for women with breast dense tissue and that this exam is optional.  Whole breast ultrasounds are thought to increase cancer

## 2025-01-07 ENCOUNTER — CLINICAL DOCUMENTATION (OUTPATIENT)
Dept: GENERAL RADIOLOGY | Age: 65
End: 2025-01-07

## 2025-01-07 NOTE — PROGRESS NOTES
Mammogram performed at [place] on January 6, 2025.  Dr Geiger's office notified patient that the radiologist has recommended some additional breast imaging, in order to make a final determination/result. A  from [facility] will contact her to schedule the additional imaging study/studies.

## 2025-01-14 ENCOUNTER — HOSPITAL ENCOUNTER (OUTPATIENT)
Dept: GENERAL RADIOLOGY | Age: 65
Discharge: HOME | End: 2025-01-16
Payer: COMMERCIAL

## 2025-01-14 DIAGNOSIS — R92.8 OTHER ABNORMAL AND INCONCLUSIVE FINDINGS ON DIAGNOSTIC IMAGING OF BREAST: ICD-10-CM

## 2025-01-14 PROCEDURE — 77065 DX MAMMO INCL CAD UNI: CPT

## 2025-01-15 ENCOUNTER — TELEPHONE (OUTPATIENT)
Dept: BREAST CENTER | Age: 65
End: 2025-01-15

## 2025-01-15 LAB
Lab: ABNORMAL
Lab: ABNORMAL

## 2025-01-15 NOTE — TELEPHONE ENCOUNTER
We reviewed her [facility] cancer test results:    Negative:  No known pathogenic or likely pathogenic variants were detected.    Reflex report: This report supersedes previous report and includes additional analyses for 71 genes not included in the first report.    Genes analyzed on this panel  AIP, ALK, APC, RODO, AXIN2, BAP1, BARD1, BMPR1A, BRCA1, BRCA2, BRIP1, CDC73, CDH1, CDK4, CDKN1B, CDKN1C, CDKN2A, CEBPA, CHEK2,  CYLD, DDX41, DICER1, EGFR, EPCAM, EXT1, EXT2, FH, FLCN, GATA2, GREM1, HOXB13, KIT, LZTR1, MAX, MEN1, MET, MITF, MLH1, MSH2, MSH3, MSH6, MUTYH, NBN, NF1, NF2, NTHL1, PALB2, PDGFRA, PHOX2B, PMS2, POLD1, POLE, POT1, DLMNG3S, PTCH1, PTEN, RAD51C, RAD51D, RB1, RET, RHBDF2, RUNX1, SDHA, SDHAF2, SDHB, SDHC, SDHD, SMAD4, SMARCA4, SMARCB1, SMARCE1, STK11, SUFU, TERC, TERT, BDWX722, TP53, TSC1, TSC2, VHL, WT1    ADDITIONAL FINDINGS: VARIANTS OF UNCERTAIN SIGNIFICANCE (VUS)  Variants of uncertain significance (VUS) are common and the American College of Medical Genetics and Genomics (ACMG) states that a VUS should NOT be used in clinical decision making. A VUS means that a change in the DNA was detected, but there is not enough information to determine whether or not the change increases the risk of cancer. Many VUS represent normal human variation. Medical management should be based on the patient’s personal and/or family history.    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 The c.3407G>C (p.E0566O) variant in the NF1  gene has not been observed in the gnomAD v2.1.1 dataset. This variant has been reported in ClinVar [ID: [id number]].        01/15/2025 Of note, today Maryam reports that she did not start her endocrine therapy with Arimidex as discussed.  She has opted for active surveillance.        Chandrika Gutierrez (Terrie) RN, MSN, APRN-CNP,

## 2025-01-16 ENCOUNTER — TELEPHONE (OUTPATIENT)
Dept: BREAST CENTER | Age: 65
End: 2025-01-16

## 2025-01-16 NOTE — TELEPHONE ENCOUNTER
JAMMIE Lew contacted patient to schedule an appointment for follow up. RN scheduled pt for 7/14/25 @ 8am with NP in [place]. Patient verbalized appointment date, time and location. RN verified number that was good to do reminder call was the one listed in chart. RN advised patient where to park and enter building for appointment.        Electronically signed by Madie Lew RN on 1/16/25 at 8:29 AM EST

## 2025-01-16 NOTE — TELEPHONE ENCOUNTER
----- Message from ALMAZ Rivera CNP sent at 1/15/2025  4:07 PM EST -----  Maryam will need a follow-up in 6 months-[place] office.  Her diagnostic imaging was benign.

## 2025-02-23 SDOH — ECONOMIC STABILITY: TRANSPORTATION INSECURITY
IN THE PAST 12 MONTHS, HAS THE LACK OF TRANSPORTATION KEPT YOU FROM MEDICAL APPOINTMENTS OR FROM GETTING MEDICATIONS?: NO

## 2025-02-23 SDOH — ECONOMIC STABILITY: FOOD INSECURITY: WITHIN THE PAST 12 MONTHS, YOU WORRIED THAT YOUR FOOD WOULD RUN OUT BEFORE YOU GOT MONEY TO BUY MORE.: NEVER TRUE

## 2025-02-23 SDOH — ECONOMIC STABILITY: FOOD INSECURITY: WITHIN THE PAST 12 MONTHS, THE FOOD YOU BOUGHT JUST DIDN'T LAST AND YOU DIDN'T HAVE MONEY TO GET MORE.: NEVER TRUE

## 2025-02-23 SDOH — ECONOMIC STABILITY: INCOME INSECURITY: IN THE LAST 12 MONTHS, WAS THERE A TIME WHEN YOU WERE NOT ABLE TO PAY THE MORTGAGE OR RENT ON TIME?: NO

## 2025-02-23 SDOH — ECONOMIC STABILITY: TRANSPORTATION INSECURITY
IN THE PAST 12 MONTHS, HAS LACK OF TRANSPORTATION KEPT YOU FROM MEETINGS, WORK, OR FROM GETTING THINGS NEEDED FOR DAILY LIVING?: NO

## 2025-02-26 ENCOUNTER — OFFICE VISIT (OUTPATIENT)
Dept: OBGYN | Age: 65
End: 2025-02-26
Payer: COMMERCIAL

## 2025-02-26 VITALS
WEIGHT: 177 LBS | SYSTOLIC BLOOD PRESSURE: 118 MMHG | BODY MASS INDEX: 29.49 KG/M2 | TEMPERATURE: 97.6 F | OXYGEN SATURATION: 96 % | DIASTOLIC BLOOD PRESSURE: 59 MMHG | HEART RATE: 90 BPM

## 2025-02-26 DIAGNOSIS — Z01.419 ENCOUNTER FOR GYNECOLOGICAL EXAMINATION: Primary | ICD-10-CM

## 2025-02-26 DIAGNOSIS — B97.7 HPV IN FEMALE: ICD-10-CM

## 2025-02-26 PROCEDURE — 99396 PREV VISIT EST AGE 40-64: CPT | Performed by: OBSTETRICS & GYNECOLOGY

## 2025-02-26 NOTE — PROGRESS NOTES
Patient alert and pleasant with no complaints  Here today for annual GYN visit   Pelvic exam completed, pap smear obtained, labeled and sent to lab. Breast exam completed.  Discharge instructions have been discussed with the patient. Patient advised to call our office with any questions or concerns.   Voiced understanding.

## 2025-02-26 NOTE — PROGRESS NOTES
HISTORY OF PRESENT ILLNESS:    64 y.o. female   presents for her annual exam.     No LMP recorded. Patient is postmenopausal.  Periods are: n/a  Contraception: n/a  Number of new sexual partners: 0  Most recent pap smear:  normal cytology, HR HPV other positive   History of abnormal pap smears: as above  Most recent mammogram:  normal  History of abnormal mammogram: as below  Most recent colonoscopy: getting colonoscopy  Dexa scan: none, no family history of osteoporosis  HPV vaccination:   Changes to health since last visit:   Complaints:       left breast DCIS  Surgery: Lumpectomy  Chemotherapy: Not recommended.  Hormonal therapy: Adjuvant Arimidex-pt declined  Radiation therapy: none  Clinical Trials: None  Genetic testing: A variant of uncertain significance (VUS) was detected in the NF1 gene(s).     Past Medical History:   Past Medical History:   Diagnosis Date    Anxiety     Breast cancer (HCC)     dcis left    Depression     HLD (hyperlipidemia)     HPV in female                                              OB History    Para Term  AB Living   4 4 2         SAB IAB Ectopic Molar Multiple Live Births             2      # Outcome Date GA Lbr Young/2nd Weight Sex Type Anes PTL Lv   4 Term            3 Term            2 Para      CS-LTranv      1 Para      Vag-Spont             Past Surgical History:   Past Surgical History:   Procedure Laterality Date    BREAST LUMPECTOMY Left 2023    Left breast mag seed localized lumpectomy performed by Sadi Perry MD at [facility]     SECTION      JUANITA STEREO BREAST BX W LOC DEVICE 1ST LESION LEFT Left 2023    JUANITA STEROTACTIC LOC BREAST BIOPSY LEFT 2023 [facility]    TONSILLECTOMY      TUBAL LIGATION      during c/s         Allergies: Fish-derived products     Medications:   Current Outpatient Medications   Medication Sig Dispense Refill    FLUoxetine (PROZAC) 20 MG capsule Take 1 capsule by mouth daily 90

## 2025-02-27 LAB
HPV SAMPLE: NORMAL
HPV SOURCE: NORMAL
HPV, GENOTYPE 16: NORMAL
HPV, GENOTYPE 18: NORMAL
HPV, HIGH RISK OTHER: NORMAL
HPV, INTERPRETATION: NORMAL

## 2025-03-03 LAB
HPV SAMPLE: ABNORMAL
HPV SOURCE: ABNORMAL
HPV, GENOTYPE 16: NOT DETECTED
HPV, GENOTYPE 18: NOT DETECTED
HPV, HIGH RISK OTHER: DETECTED
HPV, INTERPRETATION: ABNORMAL

## 2025-03-04 LAB — GYNECOLOGY CYTOLOGY REPORT: NORMAL

## 2025-07-08 ENCOUNTER — TELEPHONE (OUTPATIENT)
Age: 65
End: 2025-07-08

## 2025-07-08 NOTE — TELEPHONE ENCOUNTER
RN received call from patient requesting to reschedule appointment with JAYDA Montelongo 7/14/25 in [place] office. Patient requested early morning. Patient has been rescheduled for 7/21/25 at 8 am.     Electronically signed by Millie Wilkins RN on 7/8/25 at 1:40 PM EDT

## 2025-07-17 ENCOUNTER — TELEPHONE (OUTPATIENT)
Age: 65
End: 2025-07-17

## 2025-07-17 NOTE — TELEPHONE ENCOUNTER
RN received a call from patient in regards to upcoming appointment on 7/21/25. Patient states she would like to cancel and no reschedule at this time. RN encouraged patient to call us back and get appointment rescheduled when she can. Patient verbalized understanding.       Electronically signed by Madie Lew RN on 7/17/25 at 1:37 PM EDT

## 2025-08-05 ENCOUNTER — OFFICE VISIT (OUTPATIENT)
Dept: FAMILY MEDICINE CLINIC | Age: 65
End: 2025-08-05
Payer: COMMERCIAL

## 2025-08-05 VITALS
WEIGHT: 175 LBS | BODY MASS INDEX: 29.16 KG/M2 | TEMPERATURE: 97.1 F | HEIGHT: 65 IN | SYSTOLIC BLOOD PRESSURE: 142 MMHG | HEART RATE: 85 BPM | OXYGEN SATURATION: 94 % | DIASTOLIC BLOOD PRESSURE: 90 MMHG

## 2025-08-05 DIAGNOSIS — L24.7 IRRITANT CONTACT DERMATITIS DUE TO PLANTS, EXCEPT FOOD: Primary | ICD-10-CM

## 2025-08-05 PROCEDURE — 99213 OFFICE O/P EST LOW 20 MIN: CPT | Performed by: FAMILY MEDICINE

## 2025-08-05 RX ORDER — PREDNISONE 10 MG/1
TABLET ORAL
Qty: 18 TABLET | Refills: 0 | Status: SHIPPED | OUTPATIENT
Start: 2025-08-05 | End: 2025-08-14

## 2025-08-05 ASSESSMENT — ENCOUNTER SYMPTOMS
ABDOMINAL PAIN: 0
NAUSEA: 0
ALLERGIC/IMMUNOLOGIC NEGATIVE: 1
VOMITING: 0
COLOR CHANGE: 1
EYE DISCHARGE: 0
DIARRHEA: 0
CHEST TIGHTNESS: 0
COUGH: 0
SINUS PAIN: 0
BACK PAIN: 0
TROUBLE SWALLOWING: 0
BLOOD IN STOOL: 0
PHOTOPHOBIA: 0
ROS SKIN COMMENTS: UPPER EXTREMITIES
SHORTNESS OF BREATH: 0
EYE REDNESS: 0
SORE THROAT: 0
EYE PAIN: 0

## 2025-08-09 ENCOUNTER — OFFICE VISIT (OUTPATIENT)
Dept: FAMILY MEDICINE CLINIC | Age: 65
End: 2025-08-09

## 2025-08-09 VITALS
DIASTOLIC BLOOD PRESSURE: 86 MMHG | HEIGHT: 64 IN | OXYGEN SATURATION: 97 % | SYSTOLIC BLOOD PRESSURE: 118 MMHG | BODY MASS INDEX: 29.53 KG/M2 | RESPIRATION RATE: 20 BRPM | HEART RATE: 91 BPM | TEMPERATURE: 97.3 F | WEIGHT: 173 LBS

## 2025-08-09 DIAGNOSIS — L23.7 POISON IVY DERMATITIS: Primary | ICD-10-CM

## 2025-08-09 RX ORDER — TRIAMCINOLONE ACETONIDE 1 MG/G
CREAM TOPICAL
Qty: 45 G | Refills: 0 | Status: SHIPPED | OUTPATIENT
Start: 2025-08-09

## 2025-08-09 RX ORDER — TRIAMCINOLONE ACETONIDE 40 MG/ML
40 INJECTION, SUSPENSION INTRA-ARTICULAR; INTRAMUSCULAR ONCE
Status: COMPLETED | OUTPATIENT
Start: 2025-08-09 | End: 2025-08-09

## 2025-08-09 RX ADMIN — TRIAMCINOLONE ACETONIDE 40 MG: 40 INJECTION, SUSPENSION INTRA-ARTICULAR; INTRAMUSCULAR at 08:29

## 2025-08-09 ASSESSMENT — ENCOUNTER SYMPTOMS
TROUBLE SWALLOWING: 0
SHORTNESS OF BREATH: 0
FACIAL SWELLING: 0

## 2025-08-27 ENCOUNTER — TELEPHONE (OUTPATIENT)
Age: 65
End: 2025-08-27

## (undated) DEVICE — LIQUIBAND RAPID ADHESIVE 36/CS 0.8ML: Brand: MEDLINE

## (undated) DEVICE — NEEDLE,25GX1.5",REG,BEVEL: Brand: MEDLINE

## (undated) DEVICE — APPLICATOR MEDICATED 26 CC SOLUTION HI LT ORNG CHLORAPREP

## (undated) DEVICE — TUBING, SUCTION, 3/16" X 12', STRAIGHT: Brand: MEDLINE

## (undated) DEVICE — STANDARD HYPODERMIC NEEDLE,ALUMINUM HUB: Brand: MONOJECT

## (undated) DEVICE — TOWEL,OR,DSP,ST,BLUE,STD,6/PK,12PK/CS: Brand: MEDLINE

## (undated) DEVICE — SYRINGE MED 10ML TRNSLUC BRL PLUNG BLK MRK POLYPR CTRL

## (undated) DEVICE — COVER PRB W14XL147CM TELESCOPICALLY FLD EXT LEN CIV-FLEX

## (undated) DEVICE — GLOVE ORANGE PI 8   MSG9080

## (undated) DEVICE — ELECTRODE PT RET AD L9FT HI MOIST COND ADH HYDRGEL CORDED

## (undated) DEVICE — GOWN,SIRUS,FABRNF,L,20/CS: Brand: MEDLINE

## (undated) DEVICE — BANDAGE,GAUZE,4.5"X4.1YD,STERILE,LF: Brand: MEDLINE

## (undated) DEVICE — UNIVERSAL DRAPE: Brand: MEDLINE INDUSTRIES, INC.

## (undated) DEVICE — PLASMABLADE PS210-030S 3.0S LOCK: Brand: PLASMABLADE™

## (undated) DEVICE — MARKER SURG MARGIN STD 6 CLR INK ASST CORR-MIN ORDER 6 EACH